# Patient Record
Sex: FEMALE | Race: WHITE | NOT HISPANIC OR LATINO | Employment: OTHER | URBAN - NONMETROPOLITAN AREA
[De-identification: names, ages, dates, MRNs, and addresses within clinical notes are randomized per-mention and may not be internally consistent; named-entity substitution may affect disease eponyms.]

---

## 2023-06-06 ENCOUNTER — TELEMEDICINE (OUTPATIENT)
Dept: PRIMARY CARE | Facility: CLINIC | Age: 78
End: 2023-06-06
Payer: MEDICARE

## 2023-06-06 ENCOUNTER — TELEPHONE (OUTPATIENT)
Dept: PRIMARY CARE | Facility: CLINIC | Age: 78
End: 2023-06-06

## 2023-06-06 DIAGNOSIS — U07.1 COVID-19: Primary | ICD-10-CM

## 2023-06-06 DIAGNOSIS — E66.01 MORBID OBESITY WITH BODY MASS INDEX (BMI) OF 40.0 OR HIGHER (MULTI): ICD-10-CM

## 2023-06-06 PROBLEM — D64.9 CHRONIC ANEMIA: Status: ACTIVE | Noted: 2023-06-06

## 2023-06-06 PROBLEM — G47.30 SLEEP APNEA: Status: ACTIVE | Noted: 2023-06-06

## 2023-06-06 PROBLEM — H32 OCULAR HISTOPLASMOSIS: Status: ACTIVE | Noted: 2023-06-06

## 2023-06-06 PROBLEM — B39.9 OCULAR HISTOPLASMOSIS: Status: ACTIVE | Noted: 2023-06-06

## 2023-06-06 PROBLEM — E78.5 HYPERLIPIDEMIA: Status: ACTIVE | Noted: 2023-06-06

## 2023-06-06 PROBLEM — R73.03 PREDIABETES: Status: ACTIVE | Noted: 2023-06-06

## 2023-06-06 PROBLEM — M48.061 LUMBAR SPINAL STENOSIS: Status: ACTIVE | Noted: 2023-06-06

## 2023-06-06 PROBLEM — R79.89 LOW VITAMIN D LEVEL: Status: ACTIVE | Noted: 2023-06-06

## 2023-06-06 PROBLEM — I10 BENIGN ESSENTIAL HYPERTENSION: Status: ACTIVE | Noted: 2023-06-06

## 2023-06-06 PROCEDURE — 99441 PR PHYS/QHP TELEPHONE EVALUATION 5-10 MIN: CPT | Performed by: FAMILY MEDICINE

## 2023-06-06 RX ORDER — GLUCOSAMINE/CHONDR SU A SOD 750-600 MG
TABLET ORAL
COMMUNITY

## 2023-06-06 RX ORDER — MULTIVITAMIN
1 TABLET ORAL DAILY
COMMUNITY

## 2023-06-06 RX ORDER — MULTIVIT-MIN/IRON/FOLIC ACID/K 18-600-40
CAPSULE ORAL
COMMUNITY

## 2023-06-06 RX ORDER — ATORVASTATIN CALCIUM 40 MG/1
1 TABLET, FILM COATED ORAL DAILY
COMMUNITY
Start: 2019-07-15 | End: 2023-10-09

## 2023-06-06 NOTE — TELEPHONE ENCOUNTER
Pt tested positive for COVID 6/5/23; started feeling sick on 6/2/23 with diarrhea, dry heaves, body aches and sore throat. Sx are improving as of today. Had paxlovid at home from the last time she had COVID that she hadn't taken. Started it yesterday wonders if she should continue the full course of the med? Also since her stomach isn't feeling well is afraid to take her iron vitamin as it can upset her stomach. Wonders if okay just to eat leafy greens like spinach in the meantime? Also wonders if it's ok to take all her vitamins, B12, C and D while not feeling well? States she did remember to stop her statin while on the paxlovid.

## 2023-06-06 NOTE — PATIENT INSTRUCTIONS
Do not think paxlovid will be necessary based on reduced severity of what weve been seeing. ER for dyspnea.  Call concerns, start iron when feeling better.

## 2023-06-06 NOTE — PROGRESS NOTES
Subjective  Zulma Brooke is a 78 y.o. female who presents for Covid-19 Testing.  HPI  Zulma presents via telephone to discuss positive covid test.  She started feeling ill Friday night.  Abdominal cramping, diarrhea, body aches, cough, congestion.  Denies dyspnea.  She had a positive covid test yesterday.  She had paxlovid at home she did not take from a previous infection and did not start that.  Has not been able to take her vitamins due to not feeling well, holding lipitor due to paxlovid. States in general she is feeling much better but still body aches and loss of appetite.  Review of Systems   All other systems reviewed and are negative.  .    Objective     There were no vitals taken for this visit.   Physical Exam  Vitals reviewed.   Constitutional:       Appearance: Normal appearance.   Pulmonary:      Effort: Pulmonary effort is normal.   Neurological:      Mental Status: She is alert.         Assessment/Plan   Problem List Items Addressed This Visit          Endocrine/Metabolic    Morbid obesity with body mass index (BMI) of 40.0 or higher (CMS/[facility])     Other Visit Diagnoses       COVID-19    -  Primary                   Erica Qureshi MD

## 2023-06-06 NOTE — PROGRESS NOTES
Tested positive for COVID yesterday; hasn't felt good since 6/2/23; started taking Paxlovid yesterday.

## 2023-08-24 LAB — DAT-POLYSPECIFIC: NORMAL

## 2023-10-03 RX ORDER — SIMVASTATIN 40 MG/1
TABLET, FILM COATED ORAL
COMMUNITY
Start: 2016-10-01 | End: 2023-10-13 | Stop reason: SDDI

## 2023-10-03 RX ORDER — ATENOLOL 25 MG/1
TABLET ORAL
COMMUNITY
Start: 2016-10-01 | End: 2023-12-05 | Stop reason: ALTCHOICE

## 2023-10-03 RX ORDER — PREDNISONE 20 MG/1
40 TABLET ORAL DAILY
COMMUNITY
End: 2023-10-13 | Stop reason: ALTCHOICE

## 2023-10-05 ENCOUNTER — DOCUMENTATION (OUTPATIENT)
Dept: SURGERY | Facility: CLINIC | Age: 78
End: 2023-10-05

## 2023-10-05 ENCOUNTER — PREP FOR PROCEDURE (OUTPATIENT)
Dept: SURGERY | Facility: HOSPITAL | Age: 78
End: 2023-10-05

## 2023-10-05 ENCOUNTER — OFFICE VISIT (OUTPATIENT)
Dept: SURGERY | Facility: CLINIC | Age: 78
End: 2023-10-05
Payer: MEDICARE

## 2023-10-05 VITALS
BODY MASS INDEX: 41.82 KG/M2 | SYSTOLIC BLOOD PRESSURE: 130 MMHG | HEART RATE: 83 BPM | HEIGHT: 60 IN | DIASTOLIC BLOOD PRESSURE: 92 MMHG | WEIGHT: 213 LBS

## 2023-10-05 DIAGNOSIS — R19.5 FECAL OCCULT BLOOD TEST POSITIVE: Primary | ICD-10-CM

## 2023-10-05 DIAGNOSIS — R19.5 POSITIVE FECAL OCCULT BLOOD TEST: Primary | ICD-10-CM

## 2023-10-05 PROCEDURE — 1036F TOBACCO NON-USER: CPT | Performed by: SURGERY

## 2023-10-05 PROCEDURE — 99213 OFFICE O/P EST LOW 20 MIN: CPT | Performed by: SURGERY

## 2023-10-05 PROCEDURE — 3075F SYST BP GE 130 - 139MM HG: CPT | Performed by: SURGERY

## 2023-10-05 PROCEDURE — 1159F MED LIST DOCD IN RCRD: CPT | Performed by: SURGERY

## 2023-10-05 PROCEDURE — 3080F DIAST BP >= 90 MM HG: CPT | Performed by: SURGERY

## 2023-10-05 NOTE — H&P (VIEW-ONLY)
"General Surgery Consultation    Patient: Zulma Brooke  : 1945  MRN: [id number]  Date of Consultation: 10/05/23    Primary Care Provider: Erica Qureshi MD  Referring Provider: Yulisa Tellez CNP    Chief Complaint: +FOBT, anemia    History of Present Illness: Zulma Brooke is a 78 y.o. old female seen at the request of Yulisa Tellez CNP, for evaluation of positive fecal occult blood test.  She is anemic, with her most recent hemoglobin 9.4 in 2023. She is on an iron supplementation.  She is not on any blood thinners or antiplatelet agents.  In reviewing her labs, it looks like she has been anemic for quite some time, at least dating back to .  She had a colonoscopy just a year ago on 22 with Dr. Greene.  She had diverticulosis but no other abnormalities.  The bowel prep was noted to be excellent.  More recently, she had a positive fecal occult blood test.  She has a history of a hysterectomy and denies any vaginal bleeding.  She has never seen bright red blood per rectum.  She has intermittently had dark stools.  She has 3 bowel movements per day.  She usually strains with the first bowel movement over the morning, but not the other 2.  She takes a stool softener as needed although this is relatively infrequently.  Her mother had colon cancer diagnosed at age 80.  She reports that her mother was \"anemic her whole life\".  She denies any abdominal pain.  She has no history of peptic ulcer disease.    Medical History:  Hypertension  Anemia  Sleep apnea  Prediabetes  Hyperlipidemia  Ocular histoplasmosis  History of Bell's palsy  Lumbar spinal stenosis    Surgical History:  EGD/colonoscopy, 2022  Knee replacement  Appendectomy  Neurectomy  Breast biopsy  Tonsillectomy  Tubal ligation    Home Medications:  Prior to Admission medications    Medication Sig Start Date End Date Taking? Authorizing Provider   ascorbic acid, vitamin C, 500 mg capsule Take by mouth.   Yes Historical Provider, " MD   atorvastatin (Lipitor) 40 mg tablet Take 1 tablet (40 mg) by mouth once daily. 7/15/19  Yes Historical Provider, MD   biotin 2,500 mcg capsule Take by mouth.   Yes Historical Provider, MD   ferrous sulfate (FeroSuL) 325 (65 Fe) MG tablet Take 1 tablet (325 mg) by mouth every other day. 4/4/23  Yes Erica Qureshi MD   multivitamin tablet Take 1 tablet by mouth once daily.   Yes Historical Provider, MD   simvastatin (Zocor) 40 mg tablet  10/1/16  Yes Historical Provider, MD   atenolol (Tenormin) 25 mg tablet  10/1/16   Historical Provider, MD   predniSONE (Deltasone) 20 mg tablet Take 2 tablets (40 mg) by mouth once daily.    Historical Provider, MD       Allergies:  No Known Allergies    Family History:   Mother with colon cancer diagnosed at age 80.  Mother had chronic anemia.  Diabetes (mother and father)    Social History:  Non-smoker.  No alcohol or drug use.    ROS:  Constitutional:  no fever, sweats, and chills  Cardiovascular: No chest pain  Respiratory: + Mild sleep apnea  Gastrointestinal: + Positive fecal occult blood test.  No gross blood in the stool.  + Dark bowel movements.  Genitourinary: Sign menopause at age 42  Musculoskeletal: + History of broken bones  Integumentary: no rashes  Neurological: no confusion  Endocrine: Hot flashes  Heme/Lymph: + Chronic anemia    Objective:  BP (!) 130/92   Pulse 83   Ht 1.524 m (5')   Wt 96.6 kg (213 lb)   BMI 41.60 kg/m²     Physical Exam:  Constitutional: No acute distress, conversant, pleasant, accompanied by her   Neurologic: alert and oriented  Psych: appropriate affect  Ears, Nose, Mouth and Throat: mucus membranes moist  Pulmonary: No labored breathing  Cardiovascular: Regular rate and rhythm  Abdomen: Soft, nontender, non-distended, BMI 42  Musculoskeletal: Moves all extremities  Skin: No jaundice    Labs:  Hemoglobin 9.4 on 8/23/2023, in review of previous labs she has been anemic dating back to at least 2019    Imaging:  Pertinent imaging  available for review.    Assessment and Plan: Zulma Brooke is a 78 y.o. old female with chronic anemia. She had endoscopic evaluation a year ago, but has recently seen dark bowel movements and tested positive for fecal occult blood.  I have therefore recommended EGD and colonoscopy for further evaluation.  We discussed the risks of these procedures.  This included risks of bleeding, perforation, missed polyps, incomplete colonoscopy, and potential need for additional procedures pending findings.  She was agreeable to proceed.  Bowel prep instructions were reviewed with the patient and all of her questions were answered.  She is scheduled for EGD and colonoscopy on 10/17/2023.  We will perform this at [facility] with the assistance of Anesthesia given her history of sleep apnea and BMI 42.    Allyson Silva MD  10/5/2023

## 2023-10-05 NOTE — LETTER
2023     Erica Qureshi MD   [facility]  [facility] [place] [address]    Patient: Zulma Brooke   YOB: 1945   Date of Visit: 10/5/2023       Dear Dr. Erica Qureshi MD:    Thank you for referring Zulma Brooke to me for evaluation. Below are my notes for this consultation.  If you have questions, please do not hesitate to call me. I look forward to following your patient along with you.       Sincerely,     Allyson Silva MD      CC: Yulisa Tellez CNP  ______________________________________________________________________________________    General Surgery Consultation    Patient: Zulma Brooke  : 1945  MRN: [id number]  Date of Consultation: 10/05/23    Primary Care Provider: Erica Qureshi MD  Referring Provider: Yulisa Tellez CNP    Chief Complaint: +FOBT, anemia    History of Present Illness: Zulma Brooke is a 78 y.o. old female seen at the request of Yulisa Tellez CNP, for evaluation of positive fecal occult blood test.  She is anemic, with her most recent hemoglobin 9.4 in 2023. She is on an iron supplementation.  She is not on any blood thinners or antiplatelet agents.  In reviewing her labs, it looks like she has been anemic for quite some time, at least dating back to 2019.  She had a colonoscopy just a year ago on 22 with Dr. Greene.  She had diverticulosis but no other abnormalities.  The bowel prep was noted to be excellent.  More recently, she had a positive fecal occult blood test.  She has a history of a hysterectomy and denies any vaginal bleeding.  She has never seen bright red blood per rectum.  She has intermittently had dark stools.  She has 3 bowel movements per day.  She usually strains with the first bowel movement over the morning, but not the other 2.  She takes a stool softener as needed although this is relatively infrequently.  Her mother had colon cancer diagnosed at age 80.  She reports that her  "mother was \"anemic her whole life\".  She denies any abdominal pain.  She has no history of peptic ulcer disease.    Medical History:  Hypertension  Anemia  Sleep apnea  Prediabetes  Hyperlipidemia  Ocular histoplasmosis  History of Bell's palsy  Lumbar spinal stenosis    Surgical History:  EGD/colonoscopy, Sept 2022  Knee replacement  Appendectomy  Neurectomy  Breast biopsy  Tonsillectomy  Tubal ligation    Home Medications:  Prior to Admission medications    Medication Sig Start Date End Date Taking? Authorizing Provider   ascorbic acid, vitamin C, 500 mg capsule Take by mouth.   Yes Historical Provider, MD   atorvastatin (Lipitor) 40 mg tablet Take 1 tablet (40 mg) by mouth once daily. 7/15/19  Yes Historical Provider, MD   biotin 2,500 mcg capsule Take by mouth.   Yes Historical Provider, MD   ferrous sulfate (FeroSuL) 325 (65 Fe) MG tablet Take 1 tablet (325 mg) by mouth every other day. 4/4/23  Yes Erica Qureshi MD   multivitamin tablet Take 1 tablet by mouth once daily.   Yes Historical Provider, MD   simvastatin (Zocor) 40 mg tablet  10/1/16  Yes Historical Provider, MD   atenolol (Tenormin) 25 mg tablet  10/1/16   Historical Provider, MD   predniSONE (Deltasone) 20 mg tablet Take 2 tablets (40 mg) by mouth once daily.    Historical Provider, MD       Allergies:  No Known Allergies    Family History:   Mother with colon cancer diagnosed at age 80.  Mother had chronic anemia.  Diabetes (mother and father)    Social History:  Non-smoker.  No alcohol or drug use.    ROS:  Constitutional:  no fever, sweats, and chills  Cardiovascular: No chest pain  Respiratory: + Mild sleep apnea  Gastrointestinal: + Positive fecal occult blood test.  No gross blood in the stool.  + Dark bowel movements.  Genitourinary: Sign menopause at age 42  Musculoskeletal: + History of broken bones  Integumentary: no rashes  Neurological: no confusion  Endocrine: Hot flashes  Heme/Lymph: + Chronic anemia    Objective:  BP (!) 130/92   " Pulse 83   Ht 1.524 m (5')   Wt 96.6 kg (213 lb)   BMI 41.60 kg/m²     Physical Exam:  Constitutional: No acute distress, conversant, pleasant, accompanied by her   Neurologic: alert and oriented  Psych: appropriate affect  Ears, Nose, Mouth and Throat: mucus membranes moist  Pulmonary: No labored breathing  Cardiovascular: Regular rate and rhythm  Abdomen: Soft, nontender, non-distended, BMI 42  Musculoskeletal: Moves all extremities  Skin: No jaundice    Labs:  Hemoglobin 9.4 on 8/23/2023, in review of previous labs she has been anemic dating back to at least 2019    Imaging:  Pertinent imaging available for review.    Assessment and Plan: Zulma Brooke is a 78 y.o. old female with chronic anemia. She had endoscopic evaluation a year ago, but has recently seen dark bowel movements and tested positive for fecal occult blood.  I have therefore recommended EGD and colonoscopy for further evaluation.  We discussed the risks of these procedures.  This included risks of bleeding, perforation, missed polyps, incomplete colonoscopy, and potential need for additional procedures pending findings.  She was agreeable to proceed.  Bowel prep instructions were reviewed with the patient and all of her questions were answered.  She is scheduled for EGD and colonoscopy on 10/17/2023.  We will perform this at [facility] with the assistance of Anesthesia given her history of sleep apnea and BMI 42.    Allyson Silva MD  10/5/2023

## 2023-10-05 NOTE — PROGRESS NOTES
"General Surgery Consultation    Patient: Zulma Brooke  : 1945  MRN: [id number]  Date of Consultation: 10/05/23    Primary Care Provider: Erica Qureshi MD  Referring Provider: Yulisa Tellez CNP    Chief Complaint: +FOBT, anemia    History of Present Illness: Zulma Brooke is a 78 y.o. old female seen at the request of Yulisa Tellez CNP, for evaluation of positive fecal occult blood test.  She is anemic, with her most recent hemoglobin 9.4 in 2023. She is on an iron supplementation.  She is not on any blood thinners or antiplatelet agents.  In reviewing her labs, it looks like she has been anemic for quite some time, at least dating back to .  She had a colonoscopy just a year ago on 22 with Dr. Greene.  She had diverticulosis but no other abnormalities.  The bowel prep was noted to be excellent.  More recently, she had a positive fecal occult blood test.  She has a history of a hysterectomy and denies any vaginal bleeding.  She has never seen bright red blood per rectum.  She has intermittently had dark stools.  She has 3 bowel movements per day.  She usually strains with the first bowel movement over the morning, but not the other 2.  She takes a stool softener as needed although this is relatively infrequently.  Her mother had colon cancer diagnosed at age 80.  She reports that her mother was \"anemic her whole life\".  She denies any abdominal pain.  She has no history of peptic ulcer disease.    Medical History:  Hypertension  Anemia  Sleep apnea  Prediabetes  Hyperlipidemia  Ocular histoplasmosis  History of Bell's palsy  Lumbar spinal stenosis    Surgical History:  EGD/colonoscopy, 2022  Knee replacement  Appendectomy  Neurectomy  Breast biopsy  Tonsillectomy  Tubal ligation    Home Medications:  Prior to Admission medications    Medication Sig Start Date End Date Taking? Authorizing Provider   ascorbic acid, vitamin C, 500 mg capsule Take by mouth.   Yes Historical Provider, " MD   atorvastatin (Lipitor) 40 mg tablet Take 1 tablet (40 mg) by mouth once daily. 7/15/19  Yes Historical Provider, MD   biotin 2,500 mcg capsule Take by mouth.   Yes Historical Provider, MD   ferrous sulfate (FeroSuL) 325 (65 Fe) MG tablet Take 1 tablet (325 mg) by mouth every other day. 4/4/23  Yes Erica Qureshi MD   multivitamin tablet Take 1 tablet by mouth once daily.   Yes Historical Provider, MD   simvastatin (Zocor) 40 mg tablet  10/1/16  Yes Historical Provider, MD   atenolol (Tenormin) 25 mg tablet  10/1/16   Historical Provider, MD   predniSONE (Deltasone) 20 mg tablet Take 2 tablets (40 mg) by mouth once daily.    Historical Provider, MD       Allergies:  No Known Allergies    Family History:   Mother with colon cancer diagnosed at age 80.  Mother had chronic anemia.  Diabetes (mother and father)    Social History:  Non-smoker.  No alcohol or drug use.    ROS:  Constitutional:  no fever, sweats, and chills  Cardiovascular: No chest pain  Respiratory: + Mild sleep apnea  Gastrointestinal: + Positive fecal occult blood test.  No gross blood in the stool.  + Dark bowel movements.  Genitourinary: Sign menopause at age 42  Musculoskeletal: + History of broken bones  Integumentary: no rashes  Neurological: no confusion  Endocrine: Hot flashes  Heme/Lymph: + Chronic anemia    Objective:  BP (!) 130/92   Pulse 83   Ht 1.524 m (5')   Wt 96.6 kg (213 lb)   BMI 41.60 kg/m²     Physical Exam:  Constitutional: No acute distress, conversant, pleasant, accompanied by her   Neurologic: alert and oriented  Psych: appropriate affect  Ears, Nose, Mouth and Throat: mucus membranes moist  Pulmonary: No labored breathing  Cardiovascular: Regular rate and rhythm  Abdomen: Soft, nontender, non-distended, BMI 42  Musculoskeletal: Moves all extremities  Skin: No jaundice    Labs:  Hemoglobin 9.4 on 8/23/2023, in review of previous labs she has been anemic dating back to at least 2019    Imaging:  Pertinent imaging  available for review.    Assessment and Plan: Zulma Brooke is a 78 y.o. old female with chronic anemia. She had endoscopic evaluation a year ago, but has recently seen dark bowel movements and tested positive for fecal occult blood.  I have therefore recommended EGD and colonoscopy for further evaluation.  We discussed the risks of these procedures.  This included risks of bleeding, perforation, missed polyps, incomplete colonoscopy, and potential need for additional procedures pending findings.  She was agreeable to proceed.  Bowel prep instructions were reviewed with the patient and all of her questions were answered.  She is scheduled for EGD and colonoscopy on 10/17/2023.  We will perform this at [facility] with the assistance of Anesthesia given her history of sleep apnea and BMI 42.    Allyson Silva MD  10/5/2023

## 2023-10-05 NOTE — PROGRESS NOTES
Notified patient of Colon/ EGD  scheduled on 10-17-23  .Instructions reviewed. Advised patient [facility] will contact them 24 hours before surgery with arrival time. Pt voices understanding. Radha Gibson MA.

## 2023-10-09 DIAGNOSIS — E78.5 HYPERLIPIDEMIA, UNSPECIFIED HYPERLIPIDEMIA TYPE: ICD-10-CM

## 2023-10-09 RX ORDER — ATORVASTATIN CALCIUM 40 MG/1
40 TABLET, FILM COATED ORAL DAILY
Qty: 90 TABLET | Refills: 3 | Status: SHIPPED | OUTPATIENT
Start: 2023-10-09 | End: 2023-12-05 | Stop reason: ALTCHOICE

## 2023-10-13 RX ORDER — LANOLIN ALCOHOL/MO/W.PET/CERES
100 CREAM (GRAM) TOPICAL DAILY
COMMUNITY

## 2023-10-16 ENCOUNTER — PREP FOR PROCEDURE (OUTPATIENT)
Dept: SURGERY | Facility: HOSPITAL | Age: 78
End: 2023-10-16
Payer: MEDICARE

## 2023-10-16 ENCOUNTER — ANESTHESIA EVENT (OUTPATIENT)
Dept: OPERATING ROOM | Facility: HOSPITAL | Age: 78
End: 2023-10-16
Payer: MEDICARE

## 2023-10-16 RX ORDER — ACETAMINOPHEN 325 MG/1
650 TABLET ORAL EVERY 4 HOURS PRN
OUTPATIENT
Start: 2023-10-16

## 2023-10-16 RX ORDER — SODIUM CHLORIDE, SODIUM LACTATE, POTASSIUM CHLORIDE, CALCIUM CHLORIDE 600; 310; 30; 20 MG/100ML; MG/100ML; MG/100ML; MG/100ML
100 INJECTION, SOLUTION INTRAVENOUS CONTINUOUS
OUTPATIENT
Start: 2023-10-16

## 2023-10-17 ENCOUNTER — ANESTHESIA (OUTPATIENT)
Dept: OPERATING ROOM | Facility: HOSPITAL | Age: 78
End: 2023-10-17
Payer: MEDICARE

## 2023-10-17 ENCOUNTER — HOSPITAL ENCOUNTER (OUTPATIENT)
Dept: OPERATING ROOM | Facility: HOSPITAL | Age: 78
Setting detail: OUTPATIENT SURGERY
Discharge: HOME | End: 2023-10-17
Payer: MEDICARE

## 2023-10-17 VITALS
OXYGEN SATURATION: 96 % | DIASTOLIC BLOOD PRESSURE: 63 MMHG | TEMPERATURE: 98.2 F | SYSTOLIC BLOOD PRESSURE: 116 MMHG | BODY MASS INDEX: 40.13 KG/M2 | HEART RATE: 93 BPM | RESPIRATION RATE: 14 BRPM | WEIGHT: 205.47 LBS

## 2023-10-17 DIAGNOSIS — K29.70 GASTRITIS WITHOUT BLEEDING, UNSPECIFIED CHRONICITY, UNSPECIFIED GASTRITIS TYPE: Primary | ICD-10-CM

## 2023-10-17 DIAGNOSIS — R19.5 POSITIVE FECAL OCCULT BLOOD TEST: ICD-10-CM

## 2023-10-17 PROCEDURE — 3700000002 HC GENERAL ANESTHESIA TIME - EACH INCREMENTAL 1 MINUTE: Performed by: ANESTHESIOLOGY

## 2023-10-17 PROCEDURE — 88305 TISSUE EXAM BY PATHOLOGIST: CPT | Mod: TC,SAMLAB | Performed by: SURGERY

## 2023-10-17 PROCEDURE — 43239 EGD BIOPSY SINGLE/MULTIPLE: CPT | Performed by: SURGERY

## 2023-10-17 PROCEDURE — 2500000001 HC RX 250 WO HCPCS SELF ADMINISTERED DRUGS (ALT 637 FOR MEDICARE OP): Performed by: ANESTHESIOLOGY

## 2023-10-17 PROCEDURE — 2500000004 HC RX 250 GENERAL PHARMACY W/ HCPCS (ALT 636 FOR OP/ED): Performed by: ANESTHESIOLOGY

## 2023-10-17 PROCEDURE — 3700000001 HC GENERAL ANESTHESIA TIME - INITIAL BASE CHARGE: Performed by: ANESTHESIOLOGY

## 2023-10-17 PROCEDURE — 88305 TISSUE EXAM BY PATHOLOGIST: CPT | Mod: TC,SUR,SAMLAB | Performed by: SURGERY

## 2023-10-17 PROCEDURE — 88305 TISSUE EXAM BY PATHOLOGIST: CPT | Performed by: PATHOLOGY

## 2023-10-17 PROCEDURE — 45380 COLONOSCOPY AND BIOPSY: CPT | Performed by: SURGERY

## 2023-10-17 PROCEDURE — 2580000001 HC RX 258 IV SOLUTIONS: Performed by: ANESTHESIOLOGY

## 2023-10-17 PROCEDURE — 96372 THER/PROPH/DIAG INJ SC/IM: CPT | Mod: 59 | Performed by: ANESTHESIOLOGY

## 2023-10-17 PROCEDURE — 7100000010 HC PHASE TWO TIME - EACH INCREMENTAL 1 MINUTE: Performed by: ANESTHESIOLOGY

## 2023-10-17 PROCEDURE — 7100000009 HC PHASE TWO TIME - INITIAL BASE CHARGE: Performed by: ANESTHESIOLOGY

## 2023-10-17 PROCEDURE — 3600000002 HC OR TIME - INITIAL BASE CHARGE - PROCEDURE LEVEL TWO: Performed by: ANESTHESIOLOGY

## 2023-10-17 PROCEDURE — 3600000007 HC OR TIME - EACH INCREMENTAL 1 MINUTE - PROCEDURE LEVEL TWO: Performed by: ANESTHESIOLOGY

## 2023-10-17 RX ORDER — SODIUM CHLORIDE, SODIUM LACTATE, POTASSIUM CHLORIDE, CALCIUM CHLORIDE 600; 310; 30; 20 MG/100ML; MG/100ML; MG/100ML; MG/100ML
100 INJECTION, SOLUTION INTRAVENOUS CONTINUOUS
OUTPATIENT
Start: 2023-10-17

## 2023-10-17 RX ORDER — ONDANSETRON HYDROCHLORIDE 2 MG/ML
4 INJECTION, SOLUTION INTRAVENOUS ONCE
Status: DISCONTINUED | OUTPATIENT
Start: 2023-10-17 | End: 2023-10-17 | Stop reason: HOSPADM

## 2023-10-17 RX ORDER — MIDAZOLAM HYDROCHLORIDE 5 MG/ML
2 INJECTION, SOLUTION INTRAMUSCULAR; INTRAVENOUS ONCE
Status: DISCONTINUED | OUTPATIENT
Start: 2023-10-17 | End: 2023-10-17 | Stop reason: HOSPADM

## 2023-10-17 RX ORDER — PROPOFOL 10 MG/ML
INJECTION, EMULSION INTRAVENOUS AS NEEDED
Status: DISCONTINUED | OUTPATIENT
Start: 2023-10-17 | End: 2023-10-17

## 2023-10-17 RX ORDER — SODIUM CHLORIDE, SODIUM LACTATE, POTASSIUM CHLORIDE, CALCIUM CHLORIDE 600; 310; 30; 20 MG/100ML; MG/100ML; MG/100ML; MG/100ML
100 INJECTION, SOLUTION INTRAVENOUS CONTINUOUS
Status: DISCONTINUED | OUTPATIENT
Start: 2023-10-17 | End: 2023-10-20 | Stop reason: HOSPADM

## 2023-10-17 RX ORDER — MIDAZOLAM HYDROCHLORIDE 1 MG/ML
INJECTION INTRAMUSCULAR; INTRAVENOUS AS NEEDED
Status: DISCONTINUED | OUTPATIENT
Start: 2023-10-17 | End: 2023-10-17

## 2023-10-17 RX ORDER — LIDOCAINE HYDROCHLORIDE 20 MG/ML
JELLY TOPICAL AS NEEDED
Status: DISCONTINUED | OUTPATIENT
Start: 2023-10-17 | End: 2023-10-17

## 2023-10-17 RX ORDER — FENTANYL CITRATE 50 UG/ML
INJECTION, SOLUTION INTRAMUSCULAR; INTRAVENOUS AS NEEDED
Status: DISCONTINUED | OUTPATIENT
Start: 2023-10-17 | End: 2023-10-17

## 2023-10-17 RX ORDER — PANTOPRAZOLE SODIUM 40 MG/1
40 TABLET, DELAYED RELEASE ORAL
Qty: 30 TABLET | Refills: 0 | Status: SHIPPED | OUTPATIENT
Start: 2023-10-17 | End: 2023-12-05 | Stop reason: ALTCHOICE

## 2023-10-17 RX ADMIN — PROPOFOL 30 MG: 10 INJECTION, EMULSION INTRAVENOUS at 09:48

## 2023-10-17 RX ADMIN — FENTANYL CITRATE 50 MCG: 50 INJECTION, SOLUTION INTRAMUSCULAR; INTRAVENOUS at 09:15

## 2023-10-17 RX ADMIN — LIDOCAINE HYDROCHLORIDE 25 APPLICATION: 20 JELLY TOPICAL at 09:17

## 2023-10-17 RX ADMIN — BENZOCAINE, BUTAMBEN, AND TETRACAINE HYDROCHLORIDE 1 SPRAY: .028; .004; .004 AEROSOL, SPRAY TOPICAL at 09:19

## 2023-10-17 RX ADMIN — PROPOFOL 30 MG: 10 INJECTION, EMULSION INTRAVENOUS at 09:31

## 2023-10-17 RX ADMIN — PROPOFOL 30 MG: 10 INJECTION, EMULSION INTRAVENOUS at 09:15

## 2023-10-17 RX ADMIN — PROPOFOL 30 MG: 10 INJECTION, EMULSION INTRAVENOUS at 09:26

## 2023-10-17 RX ADMIN — SODIUM CHLORIDE, POTASSIUM CHLORIDE, SODIUM LACTATE AND CALCIUM CHLORIDE: 600; 310; 30; 20 INJECTION, SOLUTION INTRAVENOUS at 09:16

## 2023-10-17 RX ADMIN — PROPOFOL 30 MG: 10 INJECTION, EMULSION INTRAVENOUS at 09:51

## 2023-10-17 RX ADMIN — MIDAZOLAM HYDROCHLORIDE 1 MG: 1 INJECTION, SOLUTION INTRAMUSCULAR; INTRAVENOUS at 09:15

## 2023-10-17 RX ADMIN — FENTANYL CITRATE 50 MCG: 50 INJECTION, SOLUTION INTRAMUSCULAR; INTRAVENOUS at 09:41

## 2023-10-17 RX ADMIN — GLUCAGON HYDROCHLORIDE 1 MG: KIT at 09:14

## 2023-10-17 RX ADMIN — MIDAZOLAM HYDROCHLORIDE 1 MG: 1 INJECTION, SOLUTION INTRAMUSCULAR; INTRAVENOUS at 09:41

## 2023-10-17 RX ADMIN — BENZOCAINE, BUTAMBEN, AND TETRACAINE HYDROCHLORIDE 1 SPRAY: .028; .004; .004 AEROSOL, SPRAY TOPICAL at 09:18

## 2023-10-17 RX ADMIN — PROPOFOL 30 MG: 10 INJECTION, EMULSION INTRAVENOUS at 09:57

## 2023-10-17 RX ADMIN — PROPOFOL 30 MG: 10 INJECTION, EMULSION INTRAVENOUS at 09:39

## 2023-10-17 RX ADMIN — PROPOFOL 30 MG: 10 INJECTION, EMULSION INTRAVENOUS at 09:45

## 2023-10-17 RX ADMIN — PROPOFOL 30 MG: 10 INJECTION, EMULSION INTRAVENOUS at 09:24

## 2023-10-17 SDOH — HEALTH STABILITY: MENTAL HEALTH: CURRENT SMOKER: 0

## 2023-10-17 ASSESSMENT — PAIN - FUNCTIONAL ASSESSMENT
PAIN_FUNCTIONAL_ASSESSMENT: 0-10

## 2023-10-17 ASSESSMENT — PAIN SCALES - GENERAL
PAIN_LEVEL: 2
PAINLEVEL_OUTOF10: 0 - NO PAIN

## 2023-10-17 NOTE — ANESTHESIA PREPROCEDURE EVALUATION
Patient: Zulma Brooke    Procedure Information       Date/Time: 10/17/23 0900    Scheduled providers: Allyson Silva MD; Levi Mcdonald MD    Procedures:       COLONOSCOPY      EGD    Location: [facility] OR            Relevant Problems   Anesthesia (within normal limits)      Cardiovascular   (+) Benign essential hypertension   (+) Hyperlipidemia   (+) Ocular histoplasmosis      Neuro/Psych (within normal limits)      Pulmonary (within normal limits)      Hematology   (+) Chronic anemia      Musculoskeletal   (+) Lumbar spinal stenosis      Infectious Disease   (+) Ocular histoplasmosis       Clinical information reviewed:   Tobacco  Allergies  Meds   Med Hx  Surg Hx   Fam Hx  Soc Hx        NPO Detail:  NPO/Void Status  Date of Last Liquid: 10/16/23  Time of Last Liquid: 0700  Date of Last Solid: 10/16/23  Time of Last Solid: 0030  Last Intake Type: Clear fluids         Physical Exam    Airway  Mallampati: II  TM distance: >3 FB  Neck ROM: full     Cardiovascular   Rhythm: regular  Rate: normal     Dental   (+) upper dentures, lower dentures     Pulmonary   Breath sounds clear to auscultation     Abdominal   (+) obese             Anesthesia Plan    ASA 2     MAC     The patient is not a current smoker.  Patient was not previously instructed to abstain from smoking on day of procedure.  Patient did not smoke on day of procedure.    intravenous induction   Anesthetic plan and risks discussed with patient.

## 2023-10-17 NOTE — ANESTHESIA POSTPROCEDURE EVALUATION
Patient: Zulma Brooke    Procedure Summary       Date: 10/17/23 Room / Location: [facility] OR    Anesthesia Start: 0913 Anesthesia Stop: 1018    Procedures:       COLONOSCOPY      EGD Diagnosis:       Positive fecal occult blood test      Positive fecal occult blood test    Scheduled Providers: Allyson Silva MD; Levi Mcdonald MD Responsible Provider: Levi Mcdonald MD    Anesthesia Type: MAC ASA Status: 2            Anesthesia Type: MAC    Vitals Value Taken Time   BP  10/17/23 1018   Temp  10/17/23 1018   Pulse  10/17/23 1018   Resp  10/17/23 1018   SpO2  10/17/23 1018       Anesthesia Post Evaluation    Patient location during evaluation: PACU  Patient participation: complete - patient participated  Level of consciousness: awake and alert  Pain score: 2  Pain management: adequate  Airway patency: patent  Cardiovascular status: acceptable  Respiratory status: acceptable  Hydration status: acceptable        No notable events documented.

## 2023-10-18 ENCOUNTER — TELEPHONE (OUTPATIENT)
Dept: SURGERY | Facility: CLINIC | Age: 78
End: 2023-10-18
Payer: MEDICARE

## 2023-10-18 NOTE — TELEPHONE ENCOUNTER
Spoke to patient to see how they were doing after colonoscopy/egd .  Pt denied fever, chills, nausea, and vomiting. Pt needs to repeat colonoscopy in 5 years. Dr. Silva will call patient in 2-3 weeks with pathology.

## 2023-10-24 ENCOUNTER — TELEPHONE (OUTPATIENT)
Dept: SURGERY | Facility: HOSPITAL | Age: 78
End: 2023-10-24
Payer: MEDICARE

## 2023-10-24 LAB
LABORATORY COMMENT REPORT: NORMAL
PATH REPORT.FINAL DX SPEC: NORMAL
PATH REPORT.GROSS SPEC: NORMAL
PATH REPORT.TOTAL CANCER: NORMAL

## 2023-10-24 NOTE — TELEPHONE ENCOUNTER
I spoke with the patient over the phone to discuss pathology results from EGD and colonoscopy.  The polyp removed from her stomach was a fundic gland polyp.  This is benign.  She tested negative for H. pylori.  Her terminal ileum biopsy was normal.  The 3 polyps removed from her colon were all tubular adenomas, which are benign.  Given her family history (mother with colon cancer), recommend 5-year surveillance colonoscopy.    Allyson Silva MD  10/24/23  3:42 PM

## 2023-11-30 ENCOUNTER — TELEPHONE (OUTPATIENT)
Dept: PRIMARY CARE | Facility: CLINIC | Age: 78
End: 2023-11-30
Payer: MEDICARE

## 2023-11-30 DIAGNOSIS — D64.9 CHRONIC ANEMIA: Primary | ICD-10-CM

## 2023-11-30 NOTE — TELEPHONE ENCOUNTER
No, that is not in the scope of my practice.  Theres only the one group in [place], I can get her to someone out of town if she wants.

## 2023-11-30 NOTE — TELEPHONE ENCOUNTER
HAYLEY stating she has been seeing a specialist here in [place] for her iron and has not been happy with that office. Would like to see if [facility] could manage her iron instead. Has apt with KENJI next month but said she would be happy to come in sooner to address this.

## 2023-11-30 NOTE — TELEPHONE ENCOUNTER
I have a cbc ordered for her check up in January . If she wants to talk about it sooner I can order just the labs for anemia and see her this month for that, and then do her regular check up as scheduled or when she gets back from [place].

## 2023-12-04 ENCOUNTER — LAB (OUTPATIENT)
Dept: LAB | Facility: LAB | Age: 78
End: 2023-12-04
Payer: MEDICARE

## 2023-12-04 DIAGNOSIS — D64.9 CHRONIC ANEMIA: ICD-10-CM

## 2023-12-04 LAB
BASOPHILS # BLD AUTO: 0.02 X10*3/UL (ref 0–0.1)
BASOPHILS NFR BLD AUTO: 0.3 %
EOSINOPHIL # BLD AUTO: 0.12 X10*3/UL (ref 0–0.4)
EOSINOPHIL NFR BLD AUTO: 1.5 %
ERYTHROCYTE [DISTWIDTH] IN BLOOD BY AUTOMATED COUNT: 18.5 % (ref 11.5–14.5)
HCT VFR BLD AUTO: 38.4 % (ref 36–46)
HGB BLD-MCNC: 11.4 G/DL (ref 12–16)
IMM GRANULOCYTES # BLD AUTO: 0.02 X10*3/UL (ref 0–0.5)
IMM GRANULOCYTES NFR BLD AUTO: 0.3 % (ref 0–0.9)
IRON SATN MFR SERPL: 7 % (ref 25–45)
IRON SERPL-MCNC: 23 UG/DL (ref 35–150)
LYMPHOCYTES # BLD AUTO: 0.85 X10*3/UL (ref 0.8–3)
LYMPHOCYTES NFR BLD AUTO: 11 %
MCH RBC QN AUTO: 26.5 PG (ref 26–34)
MCHC RBC AUTO-ENTMCNC: 29.7 G/DL (ref 32–36)
MCV RBC AUTO: 89 FL (ref 80–100)
MONOCYTES # BLD AUTO: 0.54 X10*3/UL (ref 0.05–0.8)
MONOCYTES NFR BLD AUTO: 7 %
NEUTROPHILS # BLD AUTO: 6.21 X10*3/UL (ref 1.6–5.5)
NEUTROPHILS NFR BLD AUTO: 79.9 %
NRBC BLD-RTO: 0 /100 WBCS (ref 0–0)
PLATELET # BLD AUTO: 399 X10*3/UL (ref 150–450)
RBC # BLD AUTO: 4.31 X10*6/UL (ref 4–5.2)
TIBC SERPL-MCNC: 351 UG/DL (ref 240–445)
UIBC SERPL-MCNC: 328 UG/DL (ref 110–370)
WBC # BLD AUTO: 7.8 X10*3/UL (ref 4.4–11.3)

## 2023-12-04 PROCEDURE — 83550 IRON BINDING TEST: CPT

## 2023-12-04 PROCEDURE — 85025 COMPLETE CBC W/AUTO DIFF WBC: CPT

## 2023-12-04 PROCEDURE — 36415 COLL VENOUS BLD VENIPUNCTURE: CPT

## 2023-12-04 PROCEDURE — 83540 ASSAY OF IRON: CPT

## 2023-12-05 ENCOUNTER — OFFICE VISIT (OUTPATIENT)
Dept: PRIMARY CARE | Facility: CLINIC | Age: 78
End: 2023-12-05
Payer: MEDICARE

## 2023-12-05 VITALS
WEIGHT: 210 LBS | SYSTOLIC BLOOD PRESSURE: 120 MMHG | DIASTOLIC BLOOD PRESSURE: 70 MMHG | HEIGHT: 60 IN | HEART RATE: 76 BPM | BODY MASS INDEX: 41.23 KG/M2

## 2023-12-05 DIAGNOSIS — R73.03 PREDIABETES: ICD-10-CM

## 2023-12-05 DIAGNOSIS — I10 BENIGN ESSENTIAL HYPERTENSION: ICD-10-CM

## 2023-12-05 DIAGNOSIS — R79.89 LOW VITAMIN D LEVEL: Primary | ICD-10-CM

## 2023-12-05 DIAGNOSIS — D64.9 CHRONIC ANEMIA: ICD-10-CM

## 2023-12-05 PROCEDURE — 3078F DIAST BP <80 MM HG: CPT | Performed by: FAMILY MEDICINE

## 2023-12-05 PROCEDURE — 1036F TOBACCO NON-USER: CPT | Performed by: FAMILY MEDICINE

## 2023-12-05 PROCEDURE — 99213 OFFICE O/P EST LOW 20 MIN: CPT | Performed by: FAMILY MEDICINE

## 2023-12-05 PROCEDURE — 1126F AMNT PAIN NOTED NONE PRSNT: CPT | Performed by: FAMILY MEDICINE

## 2023-12-05 PROCEDURE — 1159F MED LIST DOCD IN RCRD: CPT | Performed by: FAMILY MEDICINE

## 2023-12-05 PROCEDURE — 1160F RVW MEDS BY RX/DR IN RCRD: CPT | Performed by: FAMILY MEDICINE

## 2023-12-05 PROCEDURE — 3074F SYST BP LT 130 MM HG: CPT | Performed by: FAMILY MEDICINE

## 2023-12-05 RX ORDER — VIT C/E/ZN/COPPR/LUTEIN/ZEAXAN 250MG-90MG
25 CAPSULE ORAL DAILY
COMMUNITY

## 2023-12-05 NOTE — PROGRESS NOTES
Subjective  Zulma Brooke is a 78 y.o. female who presents for Follow-up.  HPI  Here for follow up anemia.  Has been following hematology for iron deficiency anemia and required several iron infusions.  States her mother was always anemic.  Recently had colonoscopy and egd with Dr Silva.  Initially presented with shortness of breath and found to be anemic.  She is well pleased with our hematology dept and our infusion clinic, but she is weary of treatments.  She feels fine and would like to try seeing how she does, and if she can keep her hb above a critical level and without symptoms without requiring the infusions.  On oral supplement now once daily.    Review of Systems   All other systems reviewed and are negative.  .    Objective     Visit Vitals  /70 (BP Location: Left arm, Patient Position: Sitting)   Pulse 76      Physical Exam  Vitals reviewed.   Pulmonary:      Effort: No respiratory distress.   Skin:     Coloration: Skin is not pale.   Neurological:      Mental Status: She is alert.       Lab on 12/04/2023   Component Date Value Ref Range Status    WBC 12/04/2023 7.8  4.4 - 11.3 x10*3/uL Final    nRBC 12/04/2023 0.0  0.0 - 0.0 /100 WBCs Final    RBC 12/04/2023 4.31  4.00 - 5.20 x10*6/uL Final    Hemoglobin 12/04/2023 11.4 (L)  12.0 - 16.0 g/dL Final    Hematocrit 12/04/2023 38.4  36.0 - 46.0 % Final    MCV 12/04/2023 89  80 - 100 fL Final    MCH 12/04/2023 26.5  26.0 - 34.0 pg Final    MCHC 12/04/2023 29.7 (L)  32.0 - 36.0 g/dL Final    RDW 12/04/2023 18.5 (H)  11.5 - 14.5 % Final    Platelets 12/04/2023 399  150 - 450 x10*3/uL Final    Neutrophils % 12/04/2023 79.9  40.0 - 80.0 % Final    Immature Granulocytes %, Automated 12/04/2023 0.3  0.0 - 0.9 % Final    Immature Granulocyte Count (IG) includes promyelocytes, myelocytes and metamyelocytes but does not include bands. Percent differential counts (%) should be interpreted in the context of the absolute cell counts (cells/UL).     Lymphocytes % 12/04/2023 11.0  13.0 - 44.0 % Final    Monocytes % 12/04/2023 7.0  2.0 - 10.0 % Final    Eosinophils % 12/04/2023 1.5  0.0 - 6.0 % Final    Basophils % 12/04/2023 0.3  0.0 - 2.0 % Final    Neutrophils Absolute 12/04/2023 6.21 (H)  1.60 - 5.50 x10*3/uL Final    Percent differential counts (%) should be interpreted in the context of the absolute cell counts (cells/uL).    Immature Granulocytes Absolute, Au* 12/04/2023 0.02  0.00 - 0.50 x10*3/uL Final    Lymphocytes Absolute 12/04/2023 0.85  0.80 - 3.00 x10*3/uL Final    Monocytes Absolute 12/04/2023 0.54  0.05 - 0.80 x10*3/uL Final    Eosinophils Absolute 12/04/2023 0.12  0.00 - 0.40 x10*3/uL Final    Basophils Absolute 12/04/2023 0.02  0.00 - 0.10 x10*3/uL Final    Iron 12/04/2023 23 (L)  35 - 150 ug/dL Final    UIBC 12/04/2023 328  110 - 370 ug/dL Final    TIBC 12/04/2023 351  240 - 445 ug/dL Final    % Saturation 12/04/2023 7 (L)  25 - 45 % Final         Assessment/Plan   Problem List Items Addressed This Visit       Chronic anemia    Relevant Orders    CBC    Iron and TIBC    Benign essential hypertension    Relevant Orders    CBC    Comprehensive Metabolic Panel    Lipid Panel    TSH with reflex to Free T4 if abnormal    Vitamin B12    Hemoglobin A1C    Iron and TIBC    Vitamin D 25-Hydroxy,Total (for eval of Vitamin D levels)    Low vitamin D level - Primary    Relevant Orders    Vitamin D 25-Hydroxy,Total (for eval of Vitamin D levels)    Prediabetes    Relevant Orders    CBC    Comprehensive Metabolic Panel    Lipid Panel    TSH with reflex to Free T4 if abnormal    Vitamin B12    Hemoglobin A1C    Iron and TIBC    Vitamin D 25-Hydroxy,Total (for eval of Vitamin D levels)     Other Visit Diagnoses       Body mass index (BMI) 40.0-44.9, adult (CMS/[facility])        Relevant Orders    Vitamin D 25-Hydroxy,Total (for eval of Vitamin D levels)                   Erica Qureshi MD

## 2024-01-18 ENCOUNTER — LAB (OUTPATIENT)
Dept: LAB | Facility: LAB | Age: 79
End: 2024-01-18
Payer: MEDICARE

## 2024-01-18 DIAGNOSIS — D64.9 CHRONIC ANEMIA: ICD-10-CM

## 2024-01-18 DIAGNOSIS — R73.03 PREDIABETES: ICD-10-CM

## 2024-01-18 DIAGNOSIS — R79.89 LOW VITAMIN D LEVEL: ICD-10-CM

## 2024-01-18 DIAGNOSIS — I10 BENIGN ESSENTIAL HYPERTENSION: ICD-10-CM

## 2024-01-18 LAB
25(OH)D3 SERPL-MCNC: 31 NG/ML (ref 30–100)
ALBUMIN SERPL BCP-MCNC: 3.4 G/DL (ref 3.4–5)
ALP SERPL-CCNC: 113 U/L (ref 33–136)
ALT SERPL W P-5'-P-CCNC: 9 U/L (ref 7–45)
ANION GAP SERPL CALC-SCNC: 12 MMOL/L (ref 10–20)
AST SERPL W P-5'-P-CCNC: 11 U/L (ref 9–39)
BILIRUB SERPL-MCNC: 0.4 MG/DL (ref 0–1.2)
BUN SERPL-MCNC: 15 MG/DL (ref 6–23)
CALCIUM SERPL-MCNC: 9 MG/DL (ref 8.6–10.3)
CHLORIDE SERPL-SCNC: 106 MMOL/L (ref 98–107)
CHOLEST SERPL-MCNC: 216 MG/DL (ref 0–199)
CHOLESTEROL/HDL RATIO: 4.6
CO2 SERPL-SCNC: 27 MMOL/L (ref 21–32)
CREAT SERPL-MCNC: 0.6 MG/DL (ref 0.5–1.05)
EGFRCR SERPLBLD CKD-EPI 2021: >90 ML/MIN/1.73M*2
ERYTHROCYTE [DISTWIDTH] IN BLOOD BY AUTOMATED COUNT: 17.9 % (ref 11.5–14.5)
EST. AVERAGE GLUCOSE BLD GHB EST-MCNC: 80 MG/DL
GLUCOSE SERPL-MCNC: 108 MG/DL (ref 74–99)
HBA1C MFR BLD: 4.4 %
HCT VFR BLD AUTO: 30.6 % (ref 36–46)
HDLC SERPL-MCNC: 47 MG/DL
HGB BLD-MCNC: 8.9 G/DL (ref 12–16)
IRON SATN MFR SERPL: 5 % (ref 25–45)
IRON SERPL-MCNC: 18 UG/DL (ref 35–150)
LDLC SERPL CALC-MCNC: 144 MG/DL
MCH RBC QN AUTO: 27.1 PG (ref 26–34)
MCHC RBC AUTO-ENTMCNC: 29.1 G/DL (ref 32–36)
MCV RBC AUTO: 93 FL (ref 80–100)
NON HDL CHOLESTEROL: 169 MG/DL (ref 0–149)
NRBC BLD-RTO: 0 /100 WBCS (ref 0–0)
PLATELET # BLD AUTO: 409 X10*3/UL (ref 150–450)
POTASSIUM SERPL-SCNC: 4.3 MMOL/L (ref 3.5–5.3)
PROT SERPL-MCNC: 6.2 G/DL (ref 6.4–8.2)
RBC # BLD AUTO: 3.28 X10*6/UL (ref 4–5.2)
SODIUM SERPL-SCNC: 141 MMOL/L (ref 136–145)
TIBC SERPL-MCNC: 361 UG/DL (ref 240–445)
TRIGL SERPL-MCNC: 127 MG/DL (ref 0–149)
TSH SERPL-ACNC: 3.88 MIU/L (ref 0.44–3.98)
UIBC SERPL-MCNC: 343 UG/DL (ref 110–370)
VIT B12 SERPL-MCNC: 931 PG/ML (ref 211–911)
VLDL: 25 MG/DL (ref 0–40)
WBC # BLD AUTO: 8.5 X10*3/UL (ref 4.4–11.3)

## 2024-01-18 PROCEDURE — 84443 ASSAY THYROID STIM HORMONE: CPT

## 2024-01-18 PROCEDURE — 85027 COMPLETE CBC AUTOMATED: CPT

## 2024-01-18 PROCEDURE — 83036 HEMOGLOBIN GLYCOSYLATED A1C: CPT

## 2024-01-18 PROCEDURE — 80061 LIPID PANEL: CPT

## 2024-01-18 PROCEDURE — 83540 ASSAY OF IRON: CPT

## 2024-01-18 PROCEDURE — 80053 COMPREHEN METABOLIC PANEL: CPT

## 2024-01-18 PROCEDURE — 82306 VITAMIN D 25 HYDROXY: CPT

## 2024-01-18 PROCEDURE — 83550 IRON BINDING TEST: CPT

## 2024-01-18 PROCEDURE — 36415 COLL VENOUS BLD VENIPUNCTURE: CPT

## 2024-01-18 PROCEDURE — 82607 VITAMIN B-12: CPT

## 2024-01-22 ENCOUNTER — OFFICE VISIT (OUTPATIENT)
Dept: PRIMARY CARE | Facility: CLINIC | Age: 79
End: 2024-01-22
Payer: MEDICARE

## 2024-01-22 VITALS
SYSTOLIC BLOOD PRESSURE: 138 MMHG | DIASTOLIC BLOOD PRESSURE: 74 MMHG | BODY MASS INDEX: 42.01 KG/M2 | HEART RATE: 88 BPM | HEIGHT: 60 IN | WEIGHT: 214 LBS

## 2024-01-22 DIAGNOSIS — E78.49 OTHER HYPERLIPIDEMIA: ICD-10-CM

## 2024-01-22 DIAGNOSIS — R73.03 PREDIABETES: ICD-10-CM

## 2024-01-22 DIAGNOSIS — Z86.2 HISTORY OF ANEMIA: ICD-10-CM

## 2024-01-22 DIAGNOSIS — I10 BENIGN ESSENTIAL HYPERTENSION: ICD-10-CM

## 2024-01-22 DIAGNOSIS — D64.9 CHRONIC ANEMIA: ICD-10-CM

## 2024-01-22 DIAGNOSIS — Z00.00 ROUTINE GENERAL MEDICAL EXAMINATION AT HEALTH CARE FACILITY: Primary | ICD-10-CM

## 2024-01-22 PROBLEM — R79.89 LOW VITAMIN D LEVEL: Status: RESOLVED | Noted: 2023-06-06 | Resolved: 2024-01-22

## 2024-01-22 PROCEDURE — 3075F SYST BP GE 130 - 139MM HG: CPT | Performed by: FAMILY MEDICINE

## 2024-01-22 PROCEDURE — 3078F DIAST BP <80 MM HG: CPT | Performed by: FAMILY MEDICINE

## 2024-01-22 PROCEDURE — 1160F RVW MEDS BY RX/DR IN RCRD: CPT | Performed by: FAMILY MEDICINE

## 2024-01-22 PROCEDURE — 1159F MED LIST DOCD IN RCRD: CPT | Performed by: FAMILY MEDICINE

## 2024-01-22 PROCEDURE — 1036F TOBACCO NON-USER: CPT | Performed by: FAMILY MEDICINE

## 2024-01-22 PROCEDURE — G0439 PPPS, SUBSEQ VISIT: HCPCS | Performed by: FAMILY MEDICINE

## 2024-01-22 PROCEDURE — 1123F ACP DISCUSS/DSCN MKR DOCD: CPT | Performed by: FAMILY MEDICINE

## 2024-01-22 PROCEDURE — 99214 OFFICE O/P EST MOD 30 MIN: CPT | Performed by: FAMILY MEDICINE

## 2024-01-22 PROCEDURE — 1126F AMNT PAIN NOTED NONE PRSNT: CPT | Performed by: FAMILY MEDICINE

## 2024-01-22 PROCEDURE — 1170F FXNL STATUS ASSESSED: CPT | Performed by: FAMILY MEDICINE

## 2024-01-22 RX ORDER — FERROUS SULFATE 325(65) MG
1 TABLET ORAL 2 TIMES DAILY
Qty: 45 TABLET | Refills: 1
Start: 2024-01-22 | End: 2024-04-17 | Stop reason: SDUPTHER

## 2024-01-22 RX ORDER — ATORVASTATIN CALCIUM 40 MG/1
40 TABLET, FILM COATED ORAL DAILY
Qty: 30 TABLET | Refills: 11
Start: 2024-01-22 | End: 2025-01-21

## 2024-01-22 ASSESSMENT — ACTIVITIES OF DAILY LIVING (ADL)
GROCERY_SHOPPING: INDEPENDENT
DRESSING: INDEPENDENT
TAKING_MEDICATION: INDEPENDENT
DOING_HOUSEWORK: INDEPENDENT
MANAGING_FINANCES: INDEPENDENT
BATHING: INDEPENDENT

## 2024-01-22 ASSESSMENT — PATIENT HEALTH QUESTIONNAIRE - PHQ9
1. LITTLE INTEREST OR PLEASURE IN DOING THINGS: NOT AT ALL
SUM OF ALL RESPONSES TO PHQ9 QUESTIONS 1 AND 2: 0
2. FEELING DOWN, DEPRESSED OR HOPELESS: NOT AT ALL

## 2024-01-22 NOTE — PROGRESS NOTES
Patient presents for periodic surveillance of chronic medical problems.     Subjective  Zulma Brooke is a 78 y.o. female who presents for Annual Exam.  HPI  Chronic iron deficiency anemia, was seeing hem/onc and getting infusions, states pleased with her care but tired of infusions, wanted to hold, hb now 8.9, recommend she get back to them, discussed risks associated with letting it get lower, she feels fine right now. She is taking one iron daily, she wants to take twice daily and followup in a month with labs before deciding on going back   10 year risk over 20  percent, she stopped her statin, discussed risks, benefits, recommend she resume, has plenty at home  Low vitamin D level has resolved with supplement  Prediabetes, normal A1c, note anemia may be altering results  BMI 40, discussed diet/activity  Review of Systems   All other systems reviewed and are negative.  .    Objective     Visit Vitals  /74 (BP Location: Left arm, Patient Position: Sitting)   Pulse 88      Physical Exam  Vitals and nursing note reviewed.   Constitutional:       General: She is not in acute distress.     Appearance: Normal appearance. She is not toxic-appearing.   HENT:      Head: Normocephalic and atraumatic.   Cardiovascular:      Rate and Rhythm: Normal rate and regular rhythm.      Heart sounds: No murmur heard.  Pulmonary:      Effort: Pulmonary effort is normal.      Breath sounds: Normal breath sounds.   Musculoskeletal:      Cervical back: Neck supple. No rigidity.      Comments:     Skin:     General: Skin is warm and dry.   Neurological:      General: No focal deficit present.      Mental Status: She is alert and oriented to person, place, and time.   Psychiatric:         Mood and Affect: Mood normal.         Behavior: Behavior normal.         Assessment/Plan   Problem List Items Addressed This Visit       Chronic anemia    Benign essential hypertension    Hyperlipidemia    Relevant Medications    atorvastatin  (Lipitor) 40 mg tablet    Prediabetes     Other Visit Diagnoses       Routine general medical examination at health care facility    -  Primary    Body mass index (BMI) 40.0-44.9, adult (CMS/[facility])        History of anemia        Relevant Medications    ferrous sulfate, 325 mg ferrous sulfate, (FeroSuL) tablet    Other Relevant Orders    CBC and Auto Differential    Iron and TIBC                   Erica Qureshi MD

## 2024-02-19 ENCOUNTER — APPOINTMENT (OUTPATIENT)
Dept: PRIMARY CARE | Facility: CLINIC | Age: 79
End: 2024-02-19
Payer: MEDICARE

## 2024-02-21 ENCOUNTER — LAB (OUTPATIENT)
Dept: LAB | Facility: LAB | Age: 79
End: 2024-02-21
Payer: MEDICARE

## 2024-02-21 DIAGNOSIS — Z86.2 HISTORY OF ANEMIA: ICD-10-CM

## 2024-02-21 LAB
BASOPHILS # BLD AUTO: 0.06 X10*3/UL (ref 0–0.1)
BASOPHILS NFR BLD AUTO: 0.6 %
EOSINOPHIL # BLD AUTO: 0.16 X10*3/UL (ref 0–0.4)
EOSINOPHIL NFR BLD AUTO: 1.6 %
ERYTHROCYTE [DISTWIDTH] IN BLOOD BY AUTOMATED COUNT: 18.9 % (ref 11.5–14.5)
HCT VFR BLD AUTO: 35.8 % (ref 36–46)
HGB BLD-MCNC: 10.1 G/DL (ref 12–16)
IMM GRANULOCYTES # BLD AUTO: 0.04 X10*3/UL (ref 0–0.5)
IMM GRANULOCYTES NFR BLD AUTO: 0.4 % (ref 0–0.9)
IRON SATN MFR SERPL: 5 % (ref 25–45)
IRON SERPL-MCNC: 17 UG/DL (ref 35–150)
LYMPHOCYTES # BLD AUTO: 1.06 X10*3/UL (ref 0.8–3)
LYMPHOCYTES NFR BLD AUTO: 10.4 %
MCH RBC QN AUTO: 25.8 PG (ref 26–34)
MCHC RBC AUTO-ENTMCNC: 28.2 G/DL (ref 32–36)
MCV RBC AUTO: 92 FL (ref 80–100)
MONOCYTES # BLD AUTO: 0.51 X10*3/UL (ref 0.05–0.8)
MONOCYTES NFR BLD AUTO: 5 %
NEUTROPHILS # BLD AUTO: 8.41 X10*3/UL (ref 1.6–5.5)
NEUTROPHILS NFR BLD AUTO: 82 %
NRBC BLD-RTO: 0 /100 WBCS (ref 0–0)
PLATELET # BLD AUTO: 569 X10*3/UL (ref 150–450)
RBC # BLD AUTO: 3.91 X10*6/UL (ref 4–5.2)
TIBC SERPL-MCNC: 356 UG/DL (ref 240–445)
UIBC SERPL-MCNC: 339 UG/DL (ref 110–370)
WBC # BLD AUTO: 10.2 X10*3/UL (ref 4.4–11.3)

## 2024-02-21 PROCEDURE — 83550 IRON BINDING TEST: CPT

## 2024-02-21 PROCEDURE — 83540 ASSAY OF IRON: CPT

## 2024-02-21 PROCEDURE — 85025 COMPLETE CBC W/AUTO DIFF WBC: CPT

## 2024-02-21 PROCEDURE — 36415 COLL VENOUS BLD VENIPUNCTURE: CPT

## 2024-02-26 ENCOUNTER — OFFICE VISIT (OUTPATIENT)
Dept: PRIMARY CARE | Facility: CLINIC | Age: 79
End: 2024-02-26
Payer: MEDICARE

## 2024-02-26 VITALS
DIASTOLIC BLOOD PRESSURE: 74 MMHG | HEART RATE: 76 BPM | BODY MASS INDEX: 41.23 KG/M2 | WEIGHT: 210 LBS | SYSTOLIC BLOOD PRESSURE: 140 MMHG | HEIGHT: 60 IN

## 2024-02-26 DIAGNOSIS — D64.9 CHRONIC ANEMIA: Primary | ICD-10-CM

## 2024-02-26 PROCEDURE — 1160F RVW MEDS BY RX/DR IN RCRD: CPT | Performed by: FAMILY MEDICINE

## 2024-02-26 PROCEDURE — 1036F TOBACCO NON-USER: CPT | Performed by: FAMILY MEDICINE

## 2024-02-26 PROCEDURE — 1126F AMNT PAIN NOTED NONE PRSNT: CPT | Performed by: FAMILY MEDICINE

## 2024-02-26 PROCEDURE — 3078F DIAST BP <80 MM HG: CPT | Performed by: FAMILY MEDICINE

## 2024-02-26 PROCEDURE — 1159F MED LIST DOCD IN RCRD: CPT | Performed by: FAMILY MEDICINE

## 2024-02-26 PROCEDURE — 3077F SYST BP >= 140 MM HG: CPT | Performed by: FAMILY MEDICINE

## 2024-02-26 PROCEDURE — 99213 OFFICE O/P EST LOW 20 MIN: CPT | Performed by: FAMILY MEDICINE

## 2024-02-26 NOTE — PROGRESS NOTES
Subjective  Zulma Brooke is a 78 y.o. female who presents for Follow-up.  HPI  Here for follow up iron deficiency anemia.   On iron bid with orange juice, didn't tolerate the vitamin C tabs.  She was seeing hem/onc for infusions, but didn't want to do infusions anymore.    She feels fine, denies dyspnea, shortness of breath. Here with spouse.    Review of Systems   All other systems reviewed and are negative.  .    Objective     Visit Vitals  /74 (BP Location: Left arm, Patient Position: Sitting)   Pulse 76      Physical Exam  Vitals reviewed.   Pulmonary:      Effort: Pulmonary effort is normal.   Skin:     Coloration: Skin is not pale.   Neurological:      General: No focal deficit present.      Mental Status: She is alert.   Psychiatric:         Mood and Affect: Mood normal.       Lab on 02/21/2024   Component Date Value Ref Range Status    WBC 02/21/2024 10.2  4.4 - 11.3 x10*3/uL Final    nRBC 02/21/2024 0.0  0.0 - 0.0 /100 WBCs Final    RBC 02/21/2024 3.91 (L)  4.00 - 5.20 x10*6/uL Final    Hemoglobin 02/21/2024 10.1 (L)  12.0 - 16.0 g/dL Final    Hematocrit 02/21/2024 35.8 (L)  36.0 - 46.0 % Final    MCV 02/21/2024 92  80 - 100 fL Final    MCH 02/21/2024 25.8 (L)  26.0 - 34.0 pg Final    MCHC 02/21/2024 28.2 (L)  32.0 - 36.0 g/dL Final    RDW 02/21/2024 18.9 (H)  11.5 - 14.5 % Final    Platelets 02/21/2024 569 (H)  150 - 450 x10*3/uL Final    Neutrophils % 02/21/2024 82.0  40.0 - 80.0 % Final    Immature Granulocytes %, Automated 02/21/2024 0.4  0.0 - 0.9 % Final    Immature Granulocyte Count (IG) includes promyelocytes, myelocytes and metamyelocytes but does not include bands. Percent differential counts (%) should be interpreted in the context of the absolute cell counts (cells/UL).    Lymphocytes % 02/21/2024 10.4  13.0 - 44.0 % Final    Monocytes % 02/21/2024 5.0  2.0 - 10.0 % Final    Eosinophils % 02/21/2024 1.6  0.0 - 6.0 % Final    Basophils % 02/21/2024 0.6  0.0 - 2.0 % Final     Neutrophils Absolute 02/21/2024 8.41 (H)  1.60 - 5.50 x10*3/uL Final    Percent differential counts (%) should be interpreted in the context of the absolute cell counts (cells/uL).    Immature Granulocytes Absolute, Au* 02/21/2024 0.04  0.00 - 0.50 x10*3/uL Final    Lymphocytes Absolute 02/21/2024 1.06  0.80 - 3.00 x10*3/uL Final    Monocytes Absolute 02/21/2024 0.51  0.05 - 0.80 x10*3/uL Final    Eosinophils Absolute 02/21/2024 0.16  0.00 - 0.40 x10*3/uL Final    Basophils Absolute 02/21/2024 0.06  0.00 - 0.10 x10*3/uL Final    Iron 02/21/2024 17 (L)  35 - 150 ug/dL Final    UIBC 02/21/2024 339  110 - 370 ug/dL Final    TIBC 02/21/2024 356  240 - 445 ug/dL Final    % Saturation 02/21/2024 5 (L)  25 - 45 % Final       Assessment/Plan   Problem List Items Addressed This Visit       Chronic anemia - Primary    Relevant Orders    CBC and Auto Differential    Iron and TIBC    Vitamin B12    Comprehensive Metabolic Panel              Erica Qureshi MD

## 2024-04-17 ENCOUNTER — TELEPHONE (OUTPATIENT)
Dept: PRIMARY CARE | Facility: CLINIC | Age: 79
End: 2024-04-17
Payer: MEDICARE

## 2024-04-17 DIAGNOSIS — Z86.2 HISTORY OF ANEMIA: ICD-10-CM

## 2024-04-17 RX ORDER — FERROUS SULFATE 325(65) MG
1 TABLET ORAL 2 TIMES DAILY
Qty: 180 TABLET | Refills: 3 | Status: SHIPPED | OUTPATIENT
Start: 2024-04-17

## 2024-05-16 ENCOUNTER — LAB (OUTPATIENT)
Dept: LAB | Facility: LAB | Age: 79
End: 2024-05-16
Payer: MEDICARE

## 2024-05-16 DIAGNOSIS — D64.9 CHRONIC ANEMIA: ICD-10-CM

## 2024-05-16 LAB
ALBUMIN SERPL BCP-MCNC: 3.4 G/DL (ref 3.4–5)
ALP SERPL-CCNC: 119 U/L (ref 33–136)
ALT SERPL W P-5'-P-CCNC: 22 U/L (ref 7–45)
ANION GAP SERPL CALC-SCNC: 13 MMOL/L (ref 10–20)
AST SERPL W P-5'-P-CCNC: 17 U/L (ref 9–39)
BASOPHILS # BLD AUTO: 0.04 X10*3/UL (ref 0–0.1)
BASOPHILS NFR BLD AUTO: 0.4 %
BILIRUB SERPL-MCNC: 0.3 MG/DL (ref 0–1.2)
BUN SERPL-MCNC: 16 MG/DL (ref 6–23)
CALCIUM SERPL-MCNC: 8.8 MG/DL (ref 8.6–10.3)
CHLORIDE SERPL-SCNC: 103 MMOL/L (ref 98–107)
CO2 SERPL-SCNC: 29 MMOL/L (ref 21–32)
CREAT SERPL-MCNC: 0.6 MG/DL (ref 0.5–1.05)
EGFRCR SERPLBLD CKD-EPI 2021: >90 ML/MIN/1.73M*2
EOSINOPHIL # BLD AUTO: 0.21 X10*3/UL (ref 0–0.4)
EOSINOPHIL NFR BLD AUTO: 2.2 %
ERYTHROCYTE [DISTWIDTH] IN BLOOD BY AUTOMATED COUNT: 17.1 % (ref 11.5–14.5)
GLUCOSE SERPL-MCNC: 99 MG/DL (ref 74–99)
HCT VFR BLD AUTO: 33.9 % (ref 36–46)
HGB BLD-MCNC: 9.8 G/DL (ref 12–16)
IMM GRANULOCYTES # BLD AUTO: 0.03 X10*3/UL (ref 0–0.5)
IMM GRANULOCYTES NFR BLD AUTO: 0.3 % (ref 0–0.9)
IRON SATN MFR SERPL: 7 % (ref 25–45)
IRON SERPL-MCNC: 25 UG/DL (ref 35–150)
LYMPHOCYTES # BLD AUTO: 1.14 X10*3/UL (ref 0.8–3)
LYMPHOCYTES NFR BLD AUTO: 12 %
MCH RBC QN AUTO: 26.8 PG (ref 26–34)
MCHC RBC AUTO-ENTMCNC: 28.9 G/DL (ref 32–36)
MCV RBC AUTO: 93 FL (ref 80–100)
MONOCYTES # BLD AUTO: 0.67 X10*3/UL (ref 0.05–0.8)
MONOCYTES NFR BLD AUTO: 7.1 %
NEUTROPHILS # BLD AUTO: 7.4 X10*3/UL (ref 1.6–5.5)
NEUTROPHILS NFR BLD AUTO: 78 %
NRBC BLD-RTO: 0 /100 WBCS (ref 0–0)
PLATELET # BLD AUTO: 453 X10*3/UL (ref 150–450)
POTASSIUM SERPL-SCNC: 4.4 MMOL/L (ref 3.5–5.3)
PROT SERPL-MCNC: 6.1 G/DL (ref 6.4–8.2)
RBC # BLD AUTO: 3.65 X10*6/UL (ref 4–5.2)
SODIUM SERPL-SCNC: 141 MMOL/L (ref 136–145)
TIBC SERPL-MCNC: 334 UG/DL (ref 240–445)
UIBC SERPL-MCNC: 309 UG/DL (ref 110–370)
VIT B12 SERPL-MCNC: 934 PG/ML (ref 211–911)
WBC # BLD AUTO: 9.5 X10*3/UL (ref 4.4–11.3)

## 2024-05-16 PROCEDURE — 83540 ASSAY OF IRON: CPT

## 2024-05-16 PROCEDURE — 83550 IRON BINDING TEST: CPT

## 2024-05-16 PROCEDURE — 36415 COLL VENOUS BLD VENIPUNCTURE: CPT

## 2024-05-16 PROCEDURE — 82607 VITAMIN B-12: CPT

## 2024-05-16 PROCEDURE — 80053 COMPREHEN METABOLIC PANEL: CPT

## 2024-05-16 PROCEDURE — 85025 COMPLETE CBC W/AUTO DIFF WBC: CPT

## 2024-05-20 ENCOUNTER — OFFICE VISIT (OUTPATIENT)
Dept: PRIMARY CARE | Facility: CLINIC | Age: 79
End: 2024-05-20
Payer: MEDICARE

## 2024-05-20 VITALS
SYSTOLIC BLOOD PRESSURE: 132 MMHG | DIASTOLIC BLOOD PRESSURE: 78 MMHG | HEART RATE: 80 BPM | HEIGHT: 60 IN | BODY MASS INDEX: 41.43 KG/M2 | WEIGHT: 211 LBS

## 2024-05-20 DIAGNOSIS — D64.9 CHRONIC ANEMIA: Primary | ICD-10-CM

## 2024-05-20 DIAGNOSIS — R73.03 PREDIABETES: ICD-10-CM

## 2024-05-20 DIAGNOSIS — I10 BENIGN ESSENTIAL HYPERTENSION: ICD-10-CM

## 2024-05-20 PROCEDURE — 3078F DIAST BP <80 MM HG: CPT | Performed by: FAMILY MEDICINE

## 2024-05-20 PROCEDURE — 1036F TOBACCO NON-USER: CPT | Performed by: FAMILY MEDICINE

## 2024-05-20 PROCEDURE — 1159F MED LIST DOCD IN RCRD: CPT | Performed by: FAMILY MEDICINE

## 2024-05-20 PROCEDURE — 1160F RVW MEDS BY RX/DR IN RCRD: CPT | Performed by: FAMILY MEDICINE

## 2024-05-20 PROCEDURE — 99214 OFFICE O/P EST MOD 30 MIN: CPT | Performed by: FAMILY MEDICINE

## 2024-05-20 PROCEDURE — 3075F SYST BP GE 130 - 139MM HG: CPT | Performed by: FAMILY MEDICINE

## 2024-05-20 NOTE — PROGRESS NOTES
Subjective  Zulma Brooke is a 78 y.o. female who presents for Annual Exam.  HPI  Here for followup iron deficiency anemia, reviewed results, stable.  Recommend return to hem/onc, she declines for now.  Had an incident, a year ago, where she at a big meal, then was walking around [place] a [employer] [place], felt sweaty and like she couldn't get her breath. Hasn't had any symptoms like that since.   Had recent treatment for cold at urgent care, doing better.   Prediabetes, has been stable  HTN, stable  Review of Systems   All other systems reviewed and are negative.  .    Objective     Visit Vitals  /78 (BP Location: Left arm, Patient Position: Sitting)   Pulse 80      Physical Exam  Vitals reviewed.   Constitutional:       General: She is not in acute distress.     Appearance: She is not ill-appearing or diaphoretic.   HENT:      Head: Normocephalic.   Cardiovascular:      Rate and Rhythm: Normal rate and regular rhythm.   Pulmonary:      Effort: Pulmonary effort is normal.      Breath sounds: Normal breath sounds.   Skin:     Coloration: Skin is not jaundiced or pale.   Neurological:      General: No focal deficit present.      Mental Status: She is alert.   Psychiatric:         Mood and Affect: Mood normal.       Lab on 05/16/2024   Component Date Value Ref Range Status    WBC 05/16/2024 9.5  4.4 - 11.3 x10*3/uL Final    nRBC 05/16/2024 0.0  0.0 - 0.0 /100 WBCs Final    RBC 05/16/2024 3.65 (L)  4.00 - 5.20 x10*6/uL Final    Hemoglobin 05/16/2024 9.8 (L)  12.0 - 16.0 g/dL Final    Hematocrit 05/16/2024 33.9 (L)  36.0 - 46.0 % Final    MCV 05/16/2024 93  80 - 100 fL Final    MCH 05/16/2024 26.8  26.0 - 34.0 pg Final    MCHC 05/16/2024 28.9 (L)  32.0 - 36.0 g/dL Final    RDW 05/16/2024 17.1 (H)  11.5 - 14.5 % Final    Platelets 05/16/2024 453 (H)  150 - 450 x10*3/uL Final    Neutrophils % 05/16/2024 78.0  40.0 - 80.0 % Final    Immature Granulocytes %, Automated 05/16/2024 0.3  0.0 - 0.9 % Final    Immature  Granulocyte Count (IG) includes promyelocytes, myelocytes and metamyelocytes but does not include bands. Percent differential counts (%) should be interpreted in the context of the absolute cell counts (cells/UL).    Lymphocytes % 05/16/2024 12.0  13.0 - 44.0 % Final    Monocytes % 05/16/2024 7.1  2.0 - 10.0 % Final    Eosinophils % 05/16/2024 2.2  0.0 - 6.0 % Final    Basophils % 05/16/2024 0.4  0.0 - 2.0 % Final    Neutrophils Absolute 05/16/2024 7.40 (H)  1.60 - 5.50 x10*3/uL Final    Percent differential counts (%) should be interpreted in the context of the absolute cell counts (cells/uL).    Immature Granulocytes Absolute, Au* 05/16/2024 0.03  0.00 - 0.50 x10*3/uL Final    Lymphocytes Absolute 05/16/2024 1.14  0.80 - 3.00 x10*3/uL Final    Monocytes Absolute 05/16/2024 0.67  0.05 - 0.80 x10*3/uL Final    Eosinophils Absolute 05/16/2024 0.21  0.00 - 0.40 x10*3/uL Final    Basophils Absolute 05/16/2024 0.04  0.00 - 0.10 x10*3/uL Final    Iron 05/16/2024 25 (L)  35 - 150 ug/dL Final    UIBC 05/16/2024 309  110 - 370 ug/dL Final    TIBC 05/16/2024 334  240 - 445 ug/dL Final    % Saturation 05/16/2024 7 (L)  25 - 45 % Final    Vitamin B12 05/16/2024 934 (H)  211 - 911 pg/mL Final    Glucose 05/16/2024 99  74 - 99 mg/dL Final    Sodium 05/16/2024 141  136 - 145 mmol/L Final    Potassium 05/16/2024 4.4  3.5 - 5.3 mmol/L Final    Chloride 05/16/2024 103  98 - 107 mmol/L Final    Bicarbonate 05/16/2024 29  21 - 32 mmol/L Final    Anion Gap 05/16/2024 13  10 - 20 mmol/L Final    Urea Nitrogen 05/16/2024 16  6 - 23 mg/dL Final    Creatinine 05/16/2024 0.60  0.50 - 1.05 mg/dL Final    eGFR 05/16/2024 >90  >60 mL/min/1.73m*2 Final    Calculations of estimated GFR are performed using the 2021 CKD-EPI Study Refit equation without the race variable for the IDMS-Traceable creatinine methods.  https://jasn.asnjournals.org/content/early/2021/09/22/ASN.0293789413    Calcium 05/16/2024 8.8  8.6 - 10.3 mg/dL Final    Albumin  05/16/2024 3.4  3.4 - 5.0 g/dL Final    Alkaline Phosphatase 05/16/2024 119  33 - 136 U/L Final    Total Protein 05/16/2024 6.1 (L)  6.4 - 8.2 g/dL Final    AST 05/16/2024 17  9 - 39 U/L Final    Bilirubin, Total 05/16/2024 0.3  0.0 - 1.2 mg/dL Final    ALT 05/16/2024 22  7 - 45 U/L Final    Patients treated with Sulfasalazine may generate falsely decreased results for ALT.         Assessment/Plan   Problem List Items Addressed This Visit       Chronic anemia - Primary    Relevant Orders    Comprehensive Metabolic Panel    CBC and Auto Differential    Iron and TIBC    Benign essential hypertension    Prediabetes    Relevant Orders    Hemoglobin A1C          Followup six months, labs prior, call concerns.    Erica Qureshi MD

## 2024-11-12 ENCOUNTER — APPOINTMENT (OUTPATIENT)
Dept: PRIMARY CARE | Facility: CLINIC | Age: 79
End: 2024-11-12
Payer: MEDICARE

## 2024-11-25 ENCOUNTER — LAB (OUTPATIENT)
Dept: LAB | Facility: LAB | Age: 79
End: 2024-11-25
Payer: MEDICARE

## 2024-11-25 DIAGNOSIS — R73.03 PREDIABETES: ICD-10-CM

## 2024-11-25 DIAGNOSIS — D64.9 CHRONIC ANEMIA: ICD-10-CM

## 2024-11-25 LAB
ALBUMIN SERPL BCP-MCNC: 3.3 G/DL (ref 3.4–5)
ALP SERPL-CCNC: 106 U/L (ref 33–136)
ALT SERPL W P-5'-P-CCNC: 12 U/L (ref 7–45)
ANION GAP SERPL CALC-SCNC: 12 MMOL/L (ref 10–20)
AST SERPL W P-5'-P-CCNC: 15 U/L (ref 9–39)
BASOPHILS # BLD AUTO: 0.05 X10*3/UL (ref 0–0.1)
BASOPHILS NFR BLD AUTO: 0.7 %
BILIRUB SERPL-MCNC: 0.3 MG/DL (ref 0–1.2)
BUN SERPL-MCNC: 13 MG/DL (ref 6–23)
CALCIUM SERPL-MCNC: 9.2 MG/DL (ref 8.6–10.3)
CHLORIDE SERPL-SCNC: 105 MMOL/L (ref 98–107)
CO2 SERPL-SCNC: 29 MMOL/L (ref 21–32)
CREAT SERPL-MCNC: 0.61 MG/DL (ref 0.5–1.05)
EGFRCR SERPLBLD CKD-EPI 2021: >90 ML/MIN/1.73M*2
EOSINOPHIL # BLD AUTO: 0.17 X10*3/UL (ref 0–0.4)
EOSINOPHIL NFR BLD AUTO: 2.5 %
ERYTHROCYTE [DISTWIDTH] IN BLOOD BY AUTOMATED COUNT: 18.3 % (ref 11.5–14.5)
EST. AVERAGE GLUCOSE BLD GHB EST-MCNC: 97 MG/DL
GLUCOSE SERPL-MCNC: 102 MG/DL (ref 74–99)
HBA1C MFR BLD: 5 %
HCT VFR BLD AUTO: 36.1 % (ref 36–46)
HGB BLD-MCNC: 10.4 G/DL (ref 12–16)
IMM GRANULOCYTES # BLD AUTO: 0.02 X10*3/UL (ref 0–0.5)
IMM GRANULOCYTES NFR BLD AUTO: 0.3 % (ref 0–0.9)
IRON SATN MFR SERPL: 4 % (ref 25–45)
IRON SERPL-MCNC: 14 UG/DL (ref 35–150)
LYMPHOCYTES # BLD AUTO: 0.7 X10*3/UL (ref 0.8–3)
LYMPHOCYTES NFR BLD AUTO: 10.4 %
MCH RBC QN AUTO: 26 PG (ref 26–34)
MCHC RBC AUTO-ENTMCNC: 28.8 G/DL (ref 32–36)
MCV RBC AUTO: 90 FL (ref 80–100)
MONOCYTES # BLD AUTO: 0.43 X10*3/UL (ref 0.05–0.8)
MONOCYTES NFR BLD AUTO: 6.4 %
NEUTROPHILS # BLD AUTO: 5.34 X10*3/UL (ref 1.6–5.5)
NEUTROPHILS NFR BLD AUTO: 79.7 %
NRBC BLD-RTO: 0 /100 WBCS (ref 0–0)
PLATELET # BLD AUTO: 421 X10*3/UL (ref 150–450)
POTASSIUM SERPL-SCNC: 4.6 MMOL/L (ref 3.5–5.3)
PROT SERPL-MCNC: 6.2 G/DL (ref 6.4–8.2)
RBC # BLD AUTO: 4 X10*6/UL (ref 4–5.2)
SODIUM SERPL-SCNC: 141 MMOL/L (ref 136–145)
TIBC SERPL-MCNC: 334 UG/DL (ref 240–445)
UIBC SERPL-MCNC: 320 UG/DL (ref 110–370)
WBC # BLD AUTO: 6.7 X10*3/UL (ref 4.4–11.3)

## 2024-11-25 PROCEDURE — 83550 IRON BINDING TEST: CPT

## 2024-11-25 PROCEDURE — 83540 ASSAY OF IRON: CPT

## 2024-11-25 PROCEDURE — 85025 COMPLETE CBC W/AUTO DIFF WBC: CPT

## 2024-11-25 PROCEDURE — 80053 COMPREHEN METABOLIC PANEL: CPT

## 2024-11-25 PROCEDURE — 83036 HEMOGLOBIN GLYCOSYLATED A1C: CPT

## 2024-11-25 PROCEDURE — 36415 COLL VENOUS BLD VENIPUNCTURE: CPT

## 2024-11-26 ENCOUNTER — APPOINTMENT (OUTPATIENT)
Age: 79
End: 2024-11-26
Payer: MEDICARE

## 2024-11-26 VITALS
DIASTOLIC BLOOD PRESSURE: 82 MMHG | HEART RATE: 68 BPM | BODY MASS INDEX: 39.46 KG/M2 | SYSTOLIC BLOOD PRESSURE: 138 MMHG | WEIGHT: 201 LBS | HEIGHT: 60 IN

## 2024-11-26 DIAGNOSIS — D50.8 OTHER IRON DEFICIENCY ANEMIA: ICD-10-CM

## 2024-11-26 DIAGNOSIS — I10 PRIMARY HYPERTENSION: ICD-10-CM

## 2024-11-26 DIAGNOSIS — R73.03 PREDIABETES: Primary | ICD-10-CM

## 2024-11-26 PROCEDURE — 99214 OFFICE O/P EST MOD 30 MIN: CPT | Performed by: FAMILY MEDICINE

## 2024-11-26 PROCEDURE — G2211 COMPLEX E/M VISIT ADD ON: HCPCS | Performed by: FAMILY MEDICINE

## 2024-11-26 PROCEDURE — 1036F TOBACCO NON-USER: CPT | Performed by: FAMILY MEDICINE

## 2024-11-26 PROCEDURE — 3075F SYST BP GE 130 - 139MM HG: CPT | Performed by: FAMILY MEDICINE

## 2024-11-26 PROCEDURE — 3079F DIAST BP 80-89 MM HG: CPT | Performed by: FAMILY MEDICINE

## 2024-11-26 PROCEDURE — 1160F RVW MEDS BY RX/DR IN RCRD: CPT | Performed by: FAMILY MEDICINE

## 2024-11-26 PROCEDURE — 1159F MED LIST DOCD IN RCRD: CPT | Performed by: FAMILY MEDICINE

## 2024-11-26 NOTE — PROGRESS NOTES
Patient presents for periodic surveillance of chronic medical problems.     Subjective  Zulma Brooke is a 79 y.o. female who presents for Annual Exam.  HPIElevated glucose, improved, A1c normal  HTN, stable  Iron deficiency anemia, stable, was seeing hem /onc and getting infusions and requested to stop that.  States feels great, no problems.   Review of Systems   All other systems reviewed and are negative.  .    No Known Allergies    Current Outpatient Medications on File Prior to Visit   Medication Sig Dispense Refill    ascorbic acid, vitamin C, 500 mg capsule Take by mouth.      biotin 2,500 mcg capsule Take by mouth.      cholecalciferol (Vitamin D3) 25 MCG (1000 UT) capsule Take 1 capsule (25 mcg) by mouth once daily.      cyanocobalamin (Vitamin B-12) 1,000 mcg tablet Take 100 mcg by mouth once daily.      ferrous sulfate, 325 mg ferrous sulfate, (FeroSuL) tablet Take 1 tablet by mouth 2 times a day. 180 tablet 3    multivitamin tablet Take 1 tablet by mouth once daily.      atorvastatin (Lipitor) 40 mg tablet Take 1 tablet (40 mg) by mouth once daily. (Patient not taking: Reported on 11/26/2024) 30 tablet 11     No current facility-administered medications on file prior to visit.       Patient Active Problem List   Diagnosis    Chronic anemia    Benign essential hypertension    Hyperlipidemia    Prediabetes    Morbid obesity with body mass index (BMI) of 40.0 or higher (Multi)    Lumbar spinal stenosis    Ocular histoplasmosis    Sleep apnea       Objective     Visit Vitals  /82 (BP Location: Left arm, Patient Position: Sitting)   Pulse 68      Physical Exam  Vitals and nursing note reviewed.   Constitutional:       General: She is not in acute distress.     Appearance: Normal appearance. She is not toxic-appearing.   HENT:      Head: Normocephalic and atraumatic.   Cardiovascular:      Rate and Rhythm: Normal rate and regular rhythm.      Heart sounds: No murmur heard.  Pulmonary:      Effort:  Pulmonary effort is normal.      Breath sounds: Normal breath sounds.   Musculoskeletal:      Cervical back: Neck supple. No rigidity.      Comments:     Skin:     General: Skin is warm and dry.   Neurological:      General: No focal deficit present.      Mental Status: She is alert and oriented to person, place, and time.   Psychiatric:         Mood and Affect: Mood normal.         Behavior: Behavior normal.         Assessment/Plan   Problem List Items Addressed This Visit       Prediabetes - Primary    Relevant Orders    CBC and Auto Differential    Comprehensive Metabolic Panel    Hemoglobin A1C    Lipid Panel    TSH with reflex to Free T4 if abnormal    Vitamin B12    Iron and TIBC     Other Visit Diagnoses       Primary hypertension        Relevant Orders    Lipid Panel    Other iron deficiency anemia        Relevant Orders    Iron and TIBC             Continue current care, call concerns.  Followup six months, labs prior.       Erica Qureshi MD

## 2025-01-27 ENCOUNTER — APPOINTMENT (OUTPATIENT)
Dept: PRIMARY CARE | Facility: CLINIC | Age: 80
End: 2025-01-27
Payer: MEDICARE

## 2025-01-27 ENCOUNTER — OFFICE VISIT (OUTPATIENT)
Age: 80
End: 2025-01-27
Payer: MEDICARE

## 2025-01-27 VITALS
OXYGEN SATURATION: 98 % | WEIGHT: 197 LBS | HEIGHT: 60 IN | SYSTOLIC BLOOD PRESSURE: 138 MMHG | BODY MASS INDEX: 38.68 KG/M2 | DIASTOLIC BLOOD PRESSURE: 68 MMHG | HEART RATE: 108 BPM

## 2025-01-27 DIAGNOSIS — J06.9 URI WITH COUGH AND CONGESTION: Primary | ICD-10-CM

## 2025-01-27 DIAGNOSIS — R11.10 POST-TUSSIVE EMESIS: ICD-10-CM

## 2025-01-27 PROCEDURE — 1159F MED LIST DOCD IN RCRD: CPT | Performed by: FAMILY MEDICINE

## 2025-01-27 PROCEDURE — 3078F DIAST BP <80 MM HG: CPT | Performed by: FAMILY MEDICINE

## 2025-01-27 PROCEDURE — 1036F TOBACCO NON-USER: CPT | Performed by: FAMILY MEDICINE

## 2025-01-27 PROCEDURE — 3075F SYST BP GE 130 - 139MM HG: CPT | Performed by: FAMILY MEDICINE

## 2025-01-27 PROCEDURE — 99213 OFFICE O/P EST LOW 20 MIN: CPT | Performed by: FAMILY MEDICINE

## 2025-01-27 PROCEDURE — 1160F RVW MEDS BY RX/DR IN RCRD: CPT | Performed by: FAMILY MEDICINE

## 2025-01-27 PROCEDURE — G2211 COMPLEX E/M VISIT ADD ON: HCPCS | Performed by: FAMILY MEDICINE

## 2025-01-27 RX ORDER — AZITHROMYCIN 500 MG/1
500 TABLET, FILM COATED ORAL DAILY
Qty: 5 TABLET | Refills: 0 | Status: SHIPPED | OUTPATIENT
Start: 2025-01-27 | End: 2025-02-01

## 2025-01-27 RX ORDER — BENZONATATE 100 MG/1
100 CAPSULE ORAL 3 TIMES DAILY PRN
Qty: 42 CAPSULE | Refills: 0 | Status: SHIPPED | OUTPATIENT
Start: 2025-01-27 | End: 2025-02-26

## 2025-01-27 RX ORDER — PREDNISONE 10 MG/1
TABLET ORAL
Qty: 30 TABLET | Refills: 0 | Status: SHIPPED | OUTPATIENT
Start: 2025-01-27

## 2025-01-27 RX ORDER — ALBUTEROL SULFATE 90 UG/1
2 INHALANT RESPIRATORY (INHALATION) EVERY 4 HOURS PRN
Qty: 8 G | Refills: 5 | Status: SHIPPED | OUTPATIENT
Start: 2025-01-27 | End: 2026-01-27

## 2025-01-27 NOTE — PROGRESS NOTES
Subjective  Zulma Brooke is a 79 y.o. female who presents for Cough.  HPI  Started Thursday night with cough.  Feels short of breath.  Runny nose.  Coughing so hard she feels like she needs to vomit.  Cough worst at night.  Decreased appetite.  NO fevers.  Gets this three times a year.  Spouse does not ever catch it.   Review of Systems.    No Known Allergies    Current Outpatient Medications on File Prior to Visit   Medication Sig Dispense Refill    ascorbic acid, vitamin C, 500 mg capsule Take by mouth.      biotin 2,500 mcg capsule Take by mouth.      cholecalciferol (Vitamin D3) 25 MCG (1000 UT) capsule Take 1 capsule (25 mcg) by mouth once daily.      cyanocobalamin (Vitamin B-12) 1,000 mcg tablet Take 100 mcg by mouth once daily.      ferrous sulfate, 325 mg ferrous sulfate, (FeroSuL) tablet Take 1 tablet by mouth 2 times a day. 180 tablet 3    multivitamin tablet Take 1 tablet by mouth once daily.      atorvastatin (Lipitor) 40 mg tablet Take 1 tablet (40 mg) by mouth once daily. (Patient not taking: Reported on 11/26/2024) 30 tablet 11     No current facility-administered medications on file prior to visit.       Patient Active Problem List   Diagnosis    Chronic anemia    Benign essential hypertension    Hyperlipidemia    Prediabetes    Morbid obesity with body mass index (BMI) of 40.0 or higher (Multi)    Lumbar spinal stenosis    Ocular histoplasmosis    Sleep apnea       Objective     Visit Vitals  /68 (BP Location: Left arm, Patient Position: Sitting)   Pulse 108      Physical Exam  Vitals reviewed.   Constitutional:       General: She is not in acute distress.     Appearance: She is ill-appearing. She is not toxic-appearing.   HENT:      Nose: Congestion present.   Pulmonary:      Effort: No respiratory distress.      Breath sounds: No stridor. No wheezing or rhonchi.   Neurological:      Mental Status: She is alert.         Assessment/Plan   Problem List Items Addressed This Visit     None  Visit Diagnoses       URI with cough and congestion    -  Primary    Relevant Medications    azithromycin (Zithromax) 500 mg tablet    predniSONE (Deltasone) 10 mg tablet    albuterol (Ventolin HFA) 90 mcg/actuation inhaler    benzonatate (Tessalon) 100 mg capsule    Post-tussive emesis            Reviewed when to seek urgent and emergent care.         Side effects discussed, call concerns.   Erica Qureshi MD

## 2025-02-20 ENCOUNTER — TELEPHONE (OUTPATIENT)
Age: 80
End: 2025-02-20
Payer: MEDICARE

## 2025-02-20 DIAGNOSIS — Z12.31 SCREENING MAMMOGRAM FOR BREAST CANCER: Primary | ICD-10-CM

## 2025-03-26 ENCOUNTER — HOSPITAL ENCOUNTER (OUTPATIENT)
Dept: RADIOLOGY | Facility: CLINIC | Age: 80
Discharge: HOME | End: 2025-03-26
Payer: MEDICARE

## 2025-03-26 VITALS — BODY MASS INDEX: 38.67 KG/M2 | WEIGHT: 196.98 LBS | HEIGHT: 60 IN

## 2025-03-26 DIAGNOSIS — Z12.31 SCREENING MAMMOGRAM FOR BREAST CANCER: ICD-10-CM

## 2025-03-26 PROCEDURE — 77063 BREAST TOMOSYNTHESIS BI: CPT | Performed by: RADIOLOGY

## 2025-03-26 PROCEDURE — 77067 SCR MAMMO BI INCL CAD: CPT

## 2025-03-26 PROCEDURE — 77067 SCR MAMMO BI INCL CAD: CPT | Performed by: RADIOLOGY

## 2025-04-07 DIAGNOSIS — Z86.2 HISTORY OF ANEMIA: ICD-10-CM

## 2025-04-07 RX ORDER — FERROUS SULFATE 325(65) MG
1 TABLET ORAL 2 TIMES DAILY
Qty: 180 TABLET | Refills: 3 | Status: SHIPPED | OUTPATIENT
Start: 2025-04-07

## 2025-05-29 ENCOUNTER — APPOINTMENT (OUTPATIENT)
Age: 80
End: 2025-05-29
Payer: MEDICARE

## 2025-06-05 ENCOUNTER — LAB (OUTPATIENT)
Dept: LAB | Facility: HOSPITAL | Age: 80
End: 2025-06-05
Payer: MEDICARE

## 2025-06-05 DIAGNOSIS — D50.8 OTHER IRON DEFICIENCY ANEMIA: ICD-10-CM

## 2025-06-05 DIAGNOSIS — I10 PRIMARY HYPERTENSION: ICD-10-CM

## 2025-06-05 DIAGNOSIS — R73.03 PREDIABETES: ICD-10-CM

## 2025-06-05 LAB
ALBUMIN SERPL BCP-MCNC: 3.5 G/DL (ref 3.4–5)
ALP SERPL-CCNC: 108 U/L (ref 33–136)
ALT SERPL W P-5'-P-CCNC: 9 U/L (ref 7–45)
ANION GAP SERPL CALC-SCNC: 13 MMOL/L (ref 10–20)
AST SERPL W P-5'-P-CCNC: 12 U/L (ref 9–39)
BASOPHILS # BLD AUTO: 0.03 X10*3/UL (ref 0–0.1)
BASOPHILS NFR BLD AUTO: 0.4 %
BILIRUB SERPL-MCNC: 0.3 MG/DL (ref 0–1.2)
BUN SERPL-MCNC: 12 MG/DL (ref 6–23)
CALCIUM SERPL-MCNC: 9.3 MG/DL (ref 8.6–10.3)
CHLORIDE SERPL-SCNC: 107 MMOL/L (ref 98–107)
CHOLEST SERPL-MCNC: 252 MG/DL (ref 0–199)
CHOLESTEROL/HDL RATIO: 5.4
CO2 SERPL-SCNC: 27 MMOL/L (ref 21–32)
CREAT SERPL-MCNC: 0.54 MG/DL (ref 0.5–1.05)
EGFRCR SERPLBLD CKD-EPI 2021: >90 ML/MIN/1.73M*2
EOSINOPHIL # BLD AUTO: 0.11 X10*3/UL (ref 0–0.4)
EOSINOPHIL NFR BLD AUTO: 1.5 %
ERYTHROCYTE [DISTWIDTH] IN BLOOD BY AUTOMATED COUNT: 16.5 % (ref 11.5–14.5)
GLUCOSE SERPL-MCNC: 103 MG/DL (ref 74–99)
HCT VFR BLD AUTO: 35.5 % (ref 36–46)
HDLC SERPL-MCNC: 47 MG/DL
HGB BLD-MCNC: 10.2 G/DL (ref 12–16)
IMM GRANULOCYTES # BLD AUTO: 0.01 X10*3/UL (ref 0–0.5)
IMM GRANULOCYTES NFR BLD AUTO: 0.1 % (ref 0–0.9)
IRON SATN MFR SERPL: 7 % (ref 25–45)
IRON SERPL-MCNC: 25 UG/DL (ref 35–150)
LDLC SERPL CALC-MCNC: 179 MG/DL
LYMPHOCYTES # BLD AUTO: 0.66 X10*3/UL (ref 0.8–3)
LYMPHOCYTES NFR BLD AUTO: 8.8 %
MCH RBC QN AUTO: 26.9 PG (ref 26–34)
MCHC RBC AUTO-ENTMCNC: 28.7 G/DL (ref 32–36)
MCV RBC AUTO: 94 FL (ref 80–100)
MONOCYTES # BLD AUTO: 0.48 X10*3/UL (ref 0.05–0.8)
MONOCYTES NFR BLD AUTO: 6.4 %
NEUTROPHILS # BLD AUTO: 6.25 X10*3/UL (ref 1.6–5.5)
NEUTROPHILS NFR BLD AUTO: 82.8 %
NON HDL CHOLESTEROL: 205 MG/DL (ref 0–149)
NRBC BLD-RTO: 0 /100 WBCS (ref 0–0)
PLATELET # BLD AUTO: 348 X10*3/UL (ref 150–450)
POTASSIUM SERPL-SCNC: 4.5 MMOL/L (ref 3.5–5.3)
PROT SERPL-MCNC: 6 G/DL (ref 6.4–8.2)
RBC # BLD AUTO: 3.79 X10*6/UL (ref 4–5.2)
SODIUM SERPL-SCNC: 142 MMOL/L (ref 136–145)
TIBC SERPL-MCNC: 349 UG/DL (ref 240–445)
TRIGL SERPL-MCNC: 130 MG/DL (ref 0–149)
TSH SERPL-ACNC: 3.69 MIU/L (ref 0.44–3.98)
UIBC SERPL-MCNC: 324 UG/DL (ref 110–370)
VLDL: 26 MG/DL (ref 0–40)
WBC # BLD AUTO: 7.5 X10*3/UL (ref 4.4–11.3)

## 2025-06-05 PROCEDURE — 83550 IRON BINDING TEST: CPT

## 2025-06-05 PROCEDURE — 82607 VITAMIN B-12: CPT

## 2025-06-05 PROCEDURE — 80053 COMPREHEN METABOLIC PANEL: CPT

## 2025-06-05 PROCEDURE — 83540 ASSAY OF IRON: CPT

## 2025-06-05 PROCEDURE — 84443 ASSAY THYROID STIM HORMONE: CPT

## 2025-06-05 PROCEDURE — 85025 COMPLETE CBC W/AUTO DIFF WBC: CPT

## 2025-06-05 PROCEDURE — 80061 LIPID PANEL: CPT

## 2025-06-05 PROCEDURE — 83036 HEMOGLOBIN GLYCOSYLATED A1C: CPT

## 2025-06-06 LAB
EST. AVERAGE GLUCOSE BLD GHB EST-MCNC: 94 MG/DL
HBA1C MFR BLD: 4.9 % (ref ?–5.7)
VIT B12 SERPL-MCNC: 302 PG/ML (ref 211–911)

## 2025-06-09 ENCOUNTER — APPOINTMENT (OUTPATIENT)
Age: 80
End: 2025-06-09
Payer: MEDICARE

## 2025-06-25 ENCOUNTER — APPOINTMENT (OUTPATIENT)
Age: 80
End: 2025-06-25
Payer: MEDICARE

## 2025-06-25 VITALS
HEART RATE: 84 BPM | DIASTOLIC BLOOD PRESSURE: 80 MMHG | WEIGHT: 204 LBS | BODY MASS INDEX: 40.05 KG/M2 | HEIGHT: 60 IN | SYSTOLIC BLOOD PRESSURE: 138 MMHG

## 2025-06-25 DIAGNOSIS — I10 PRIMARY HYPERTENSION: ICD-10-CM

## 2025-06-25 DIAGNOSIS — M48.061 SPINAL STENOSIS OF LUMBAR REGION, UNSPECIFIED WHETHER NEUROGENIC CLAUDICATION PRESENT: ICD-10-CM

## 2025-06-25 DIAGNOSIS — R73.03 PREDIABETES: ICD-10-CM

## 2025-06-25 DIAGNOSIS — D64.9 CHRONIC ANEMIA: ICD-10-CM

## 2025-06-25 DIAGNOSIS — I10 BENIGN ESSENTIAL HYPERTENSION: ICD-10-CM

## 2025-06-25 DIAGNOSIS — Z11.59 NEED FOR HEPATITIS C SCREENING TEST: ICD-10-CM

## 2025-06-25 DIAGNOSIS — Z00.00 ROUTINE GENERAL MEDICAL EXAMINATION AT HEALTH CARE FACILITY: ICD-10-CM

## 2025-06-25 DIAGNOSIS — E78.49 OTHER HYPERLIPIDEMIA: ICD-10-CM

## 2025-06-25 DIAGNOSIS — R26.2 DIFFICULTY WALKING: Primary | ICD-10-CM

## 2025-06-25 PROBLEM — E66.01 MORBID OBESITY WITH BODY MASS INDEX (BMI) OF 40.0 OR HIGHER (MULTI): Status: RESOLVED | Noted: 2023-06-06 | Resolved: 2025-06-25

## 2025-06-25 PROCEDURE — 1123F ACP DISCUSS/DSCN MKR DOCD: CPT | Performed by: FAMILY MEDICINE

## 2025-06-25 PROCEDURE — 99214 OFFICE O/P EST MOD 30 MIN: CPT | Performed by: FAMILY MEDICINE

## 2025-06-25 PROCEDURE — 3075F SYST BP GE 130 - 139MM HG: CPT | Performed by: FAMILY MEDICINE

## 2025-06-25 PROCEDURE — 1170F FXNL STATUS ASSESSED: CPT | Performed by: FAMILY MEDICINE

## 2025-06-25 PROCEDURE — 1160F RVW MEDS BY RX/DR IN RCRD: CPT | Performed by: FAMILY MEDICINE

## 2025-06-25 PROCEDURE — 3079F DIAST BP 80-89 MM HG: CPT | Performed by: FAMILY MEDICINE

## 2025-06-25 PROCEDURE — 1036F TOBACCO NON-USER: CPT | Performed by: FAMILY MEDICINE

## 2025-06-25 PROCEDURE — 1159F MED LIST DOCD IN RCRD: CPT | Performed by: FAMILY MEDICINE

## 2025-06-25 PROCEDURE — G2211 COMPLEX E/M VISIT ADD ON: HCPCS | Performed by: FAMILY MEDICINE

## 2025-06-25 PROCEDURE — G0439 PPPS, SUBSEQ VISIT: HCPCS | Performed by: FAMILY MEDICINE

## 2025-06-25 RX ORDER — ATORVASTATIN CALCIUM 40 MG/1
40 TABLET, FILM COATED ORAL DAILY
Qty: 100 TABLET | Refills: 3 | Status: SHIPPED | OUTPATIENT
Start: 2025-06-25 | End: 2026-07-30

## 2025-06-25 ASSESSMENT — ACTIVITIES OF DAILY LIVING (ADL)
GROCERY_SHOPPING: INDEPENDENT
DRESSING: INDEPENDENT
TAKING_MEDICATION: INDEPENDENT
DOING_HOUSEWORK: INDEPENDENT
BATHING: INDEPENDENT
MANAGING_FINANCES: INDEPENDENT

## 2025-06-25 ASSESSMENT — PATIENT HEALTH QUESTIONNAIRE - PHQ9
SUM OF ALL RESPONSES TO PHQ9 QUESTIONS 1 AND 2: 0
1. LITTLE INTEREST OR PLEASURE IN DOING THINGS: NOT AT ALL
2. FEELING DOWN, DEPRESSED OR HOPELESS: NOT AT ALL

## 2025-06-25 NOTE — PROGRESS NOTES
Subjective   Reason for Visit: Zulma Brooke is an 80 y.o. female here for a Medicare Wellness visit.     Past Medical, Surgical, and Family History reviewed and updated in chart.    Reviewed all medications by prescribing practitioner or clinical pharmacist (such as prescriptions, OTCs, herbal therapies and supplements) and documented in the medical record.    HPI  Chronic anemia, was seeing hem/onc, stable, declines to go back for now  Dyslipidemia, she stopped statin, based on ten year risk, recommend she resume  HTN< stable  History of prediabetes improved  Recommend shingrix.  Patient Care Team:  Erica Qureshi MD as PCP - General (Family Medicine)  Erica Qureshi MD as PCP - [employer] PCP     Review of Systems   All other systems reviewed and are negative.      Objective   Vitals:  /80 (BP Location: Right arm, Patient Position: Sitting)   Pulse 84   Ht (!) 1.524 m (5')   Wt 92.5 kg (204 lb)   BMI 39.84 kg/m²       Physical Exam  Vitals and nursing note reviewed.   Constitutional:       General: She is not in acute distress.     Appearance: Normal appearance. She is obese. She is not toxic-appearing.   HENT:      Head: Normocephalic and atraumatic.   Cardiovascular:      Rate and Rhythm: Normal rate and regular rhythm.      Heart sounds: No murmur heard.  Pulmonary:      Effort: Pulmonary effort is normal.      Breath sounds: Normal breath sounds.   Musculoskeletal:      Cervical back: Neck supple. No rigidity.      Comments:     Skin:     General: Skin is warm and dry.   Neurological:      General: No focal deficit present.      Mental Status: She is alert and oriented to person, place, and time.   Psychiatric:         Mood and Affect: Mood normal.         Behavior: Behavior normal.       Lab on 06/05/2025   Component Date Value Ref Range Status    WBC 06/05/2025 7.5  4.4 - 11.3 x10*3/uL Final    nRBC 06/05/2025 0.0  0.0 - 0.0 /100 WBCs Final    RBC 06/05/2025 3.79 (L)  4.00 -  5.20 x10*6/uL Final    Hemoglobin 06/05/2025 10.2 (L)  12.0 - 16.0 g/dL Final    Hematocrit 06/05/2025 35.5 (L)  36.0 - 46.0 % Final    MCV 06/05/2025 94  80 - 100 fL Final    MCH 06/05/2025 26.9  26.0 - 34.0 pg Final    MCHC 06/05/2025 28.7 (L)  32.0 - 36.0 g/dL Final    RDW 06/05/2025 16.5 (H)  11.5 - 14.5 % Final    Platelets 06/05/2025 348  150 - 450 x10*3/uL Final    Neutrophils % 06/05/2025 82.8  40.0 - 80.0 % Final    Immature Granulocytes %, Automated 06/05/2025 0.1  0.0 - 0.9 % Final    Immature Granulocyte Count (IG) includes promyelocytes, myelocytes and metamyelocytes but does not include bands. Percent differential counts (%) should be interpreted in the context of the absolute cell counts (cells/UL).    Lymphocytes % 06/05/2025 8.8  13.0 - 44.0 % Final    Monocytes % 06/05/2025 6.4  2.0 - 10.0 % Final    Eosinophils % 06/05/2025 1.5  0.0 - 6.0 % Final    Basophils % 06/05/2025 0.4  0.0 - 2.0 % Final    Neutrophils Absolute 06/05/2025 6.25 (H)  1.60 - 5.50 x10*3/uL Final    Percent differential counts (%) should be interpreted in the context of the absolute cell counts (cells/uL).    Immature Granulocytes Absolute, Au* 06/05/2025 0.01  0.00 - 0.50 x10*3/uL Final    Lymphocytes Absolute 06/05/2025 0.66 (L)  0.80 - 3.00 x10*3/uL Final    Monocytes Absolute 06/05/2025 0.48  0.05 - 0.80 x10*3/uL Final    Eosinophils Absolute 06/05/2025 0.11  0.00 - 0.40 x10*3/uL Final    Basophils Absolute 06/05/2025 0.03  0.00 - 0.10 x10*3/uL Final    Glucose 06/05/2025 103 (H)  74 - 99 mg/dL Final    Sodium 06/05/2025 142  136 - 145 mmol/L Final    Potassium 06/05/2025 4.5  3.5 - 5.3 mmol/L Final    Chloride 06/05/2025 107  98 - 107 mmol/L Final    Bicarbonate 06/05/2025 27  21 - 32 mmol/L Final    Anion Gap 06/05/2025 13  10 - 20 mmol/L Final    Urea Nitrogen 06/05/2025 12  6 - 23 mg/dL Final    Creatinine 06/05/2025 0.54  0.50 - 1.05 mg/dL Final    eGFR 06/05/2025 >90  >60 mL/min/1.73m*2 Final    Calculations of  estimated GFR are performed using the 2021 CKD-EPI Study Refit equation without the race variable for the IDMS-Traceable creatinine methods.  https://jasn.asnjournals.org/content/early/2021/09/22/ASN.0554530965    Calcium 06/05/2025 9.3  8.6 - 10.3 mg/dL Final    Albumin 06/05/2025 3.5  3.4 - 5.0 g/dL Final    Alkaline Phosphatase 06/05/2025 108  33 - 136 U/L Final    Total Protein 06/05/2025 6.0 (L)  6.4 - 8.2 g/dL Final    AST 06/05/2025 12  9 - 39 U/L Final    Bilirubin, Total 06/05/2025 0.3  0.0 - 1.2 mg/dL Final    ALT 06/05/2025 9  7 - 45 U/L Final    Patients treated with Sulfasalazine may generate falsely decreased results for ALT.    Hemoglobin A1C 06/05/2025 4.9  See comment % Final    Estimated Average Glucose 06/05/2025 94  Not Established mg/dL Final    Cholesterol 06/05/2025 252 (H)  0 - 199 mg/dL Final          Age      Desirable   Borderline High   High     0-19 Y     0 - 169       170 - 199     >/= 200    20-24 Y     0 - 189       190 - 224     >/= 225         >24 Y     0 - 199       200 - 239     >/= 240   **All ranges are based on fasting samples. Specific   therapeutic targets will vary based on patient-specific   cardiac risk.    Pediatric guidelines reference:Pediatrics 2011, 128(S5).Adult guidelines reference: NCEP ATPIII Guidelines,TANI 2001, 258:2486-97    Venipuncture immediately after or during the administration of Metamizole may lead to falsely low results. Testing should be performed immediately prior to Metamizole dosing.    HDL-Cholesterol 06/05/2025 47.0  mg/dL Final      Age       Very Low   Low     Normal    High    0-19 Y    < 35      < 40     40-45     ----  20-24 Y    ----     < 40      >45      ----        >24 Y      ----     < 40     40-60      >60      Cholesterol/HDL Ratio 06/05/2025 5.4   Final      Ref Values  Desirable  < 3.4  High Risk  > 5.0    LDL Calculated 06/05/2025 179 (H)  <=99 mg/dL Final                                Near   Borderline      AGE      Desirable   Optimal    High     High     Very High     0-19 Y     0 - 109     ---    110-129   >/= 130     ----    20-24 Y     0 - 119     ---    120-159   >/= 160     ----      >24 Y     0 -  99   100-129  130-159   160-189     >/=190      VLDL 06/05/2025 26  0 - 40 mg/dL Final    Triglycerides 06/05/2025 130  0 - 149 mg/dL Final    Age              Desirable        Borderline         High        Very High  SEX:B           mg/dL             mg/dL               mg/dL      mg/dL  <=14D                       ----               ----        ----  15D-365D                    ----               ----        ----  1Y-9Y           0-74               75-99             >=100       ----  10Y-19Y        0-89                            >=130       ----  20Y-24Y        0-114             115-149             >=150      ----  >= 25Y         0-149             150-199             200-499    >=500      Venipuncture immediately after or during the administration of Metamizole may lead to falsely low results. Testing should be performed immediately prior to Metamizole dosing.    Non HDL Cholesterol 06/05/2025 205 (H)  0 - 149 mg/dL Final          Age       Desirable   Borderline High   High     Very High     0-19 Y     0 - 119       120 - 144     >/= 145    >/= 160    20-24 Y     0 - 149       150 - 189     >/= 190      ----         >24 Y    30 mg/dL above LDL Cholesterol goal      Thyroid Stimulating Hormone 06/05/2025 3.69  0.44 - 3.98 mIU/L Final    Vitamin B12 06/05/2025 302  211 - 911 pg/mL Final    Iron 06/05/2025 25 (L)  35 - 150 ug/dL Final    UIBC 06/05/2025 324  110 - 370 ug/dL Final    TIBC 06/05/2025 349  240 - 445 ug/dL Final    % Saturation 06/05/2025 7 (L)  25 - 45 % Final       Assessment & Plan  Difficulty walking    Orders:    Disability Placard    Routine general medical examination at health care facility    Orders:    1 Year Follow Up In Primary Care - Wellness Exam; Future    Other hyperlipidemia    Orders:     atorvastatin (Lipitor) 40 mg tablet; Take 1 tablet (40 mg) by mouth once daily.    Prediabetes    Orders:    CBC and Auto Differential; Future    Comprehensive Metabolic Panel; Future    Lipid Panel; Future    TSH with reflex to Free T4 if abnormal; Future    Hemoglobin A1C; Future    Benign essential hypertension         Chronic anemia    Orders:    CBC and Auto Differential; Future    Comprehensive Metabolic Panel; Future    Lipid Panel; Future    TSH with reflex to Free T4 if abnormal; Future    Hemoglobin A1C; Future    Iron and TIBC; Future    Need for hepatitis C screening test    Orders:    Hepatitis C Antibody; Future    Primary hypertension    Orders:    Lipid Panel; Future     Call concerns.  Follow up 1 year, labs prior.

## 2025-06-25 NOTE — PROGRESS NOTES
Med check   Problem: Discharge Planning  Goal: Discharge to home or other facility with appropriate resources  Outcome: Progressing     Problem: Pain  Goal: Verbalizes/displays adequate comfort level or baseline comfort level  Outcome: Progressing     Problem: Safety - Adult  Goal: Free from fall injury  Outcome: Progressing

## 2025-06-25 NOTE — ASSESSMENT & PLAN NOTE
Orders:    CBC and Auto Differential; Future    Comprehensive Metabolic Panel; Future    Lipid Panel; Future    TSH with reflex to Free T4 if abnormal; Future    Hemoglobin A1C; Future    Iron and TIBC; Future

## 2025-06-25 NOTE — ASSESSMENT & PLAN NOTE
Orders:    CBC and Auto Differential; Future    Comprehensive Metabolic Panel; Future    Lipid Panel; Future    TSH with reflex to Free T4 if abnormal; Future    Hemoglobin A1C; Future

## 2026-06-25 ENCOUNTER — APPOINTMENT (OUTPATIENT)
Age: 81
End: 2026-06-25
Payer: MEDICARE